# Patient Record
Sex: FEMALE | Race: WHITE | NOT HISPANIC OR LATINO | Employment: FULL TIME | URBAN - METROPOLITAN AREA
[De-identification: names, ages, dates, MRNs, and addresses within clinical notes are randomized per-mention and may not be internally consistent; named-entity substitution may affect disease eponyms.]

---

## 2019-10-31 ENCOUNTER — OFFICE VISIT (OUTPATIENT)
Dept: URGENT CARE | Facility: CLINIC | Age: 34
End: 2019-10-31
Payer: MEDICAID

## 2019-10-31 VITALS
HEIGHT: 62 IN | TEMPERATURE: 98 F | WEIGHT: 116 LBS | BODY MASS INDEX: 21.35 KG/M2 | HEART RATE: 80 BPM | RESPIRATION RATE: 20 BRPM | DIASTOLIC BLOOD PRESSURE: 80 MMHG | SYSTOLIC BLOOD PRESSURE: 110 MMHG

## 2019-10-31 DIAGNOSIS — R11.2 NON-INTRACTABLE VOMITING WITH NAUSEA, UNSPECIFIED VOMITING TYPE: Primary | ICD-10-CM

## 2019-10-31 DIAGNOSIS — R19.7 DIARRHEA, UNSPECIFIED TYPE: ICD-10-CM

## 2019-10-31 PROCEDURE — 99214 PR OFFICE/OUTPT VISIT, EST, LEVL IV, 30-39 MIN: ICD-10-PCS | Mod: S$PBB,,, | Performed by: PHYSICIAN ASSISTANT

## 2019-10-31 PROCEDURE — 99214 OFFICE O/P EST MOD 30 MIN: CPT | Mod: S$PBB,,, | Performed by: PHYSICIAN ASSISTANT

## 2019-10-31 PROCEDURE — S0119 ONDANSETRON 4 MG: HCPCS | Mod: PBBFAC,PO

## 2019-10-31 PROCEDURE — 99999 PR PBB SHADOW E&M-NEW PATIENT-LVL III: ICD-10-PCS | Mod: PBBFAC,,, | Performed by: PHYSICIAN ASSISTANT

## 2019-10-31 PROCEDURE — 99999 PR PBB SHADOW E&M-NEW PATIENT-LVL III: CPT | Mod: PBBFAC,,, | Performed by: PHYSICIAN ASSISTANT

## 2019-10-31 PROCEDURE — 99203 OFFICE O/P NEW LOW 30 MIN: CPT | Mod: PBBFAC,PO | Performed by: PHYSICIAN ASSISTANT

## 2019-10-31 RX ORDER — ONDANSETRON 4 MG/1
4 TABLET, ORALLY DISINTEGRATING ORAL EVERY 8 HOURS PRN
Qty: 12 TABLET | Refills: 0 | Status: SHIPPED | OUTPATIENT
Start: 2019-10-31 | End: 2022-07-22

## 2019-10-31 RX ORDER — BUPROPION HYDROCHLORIDE 300 MG/1
300 TABLET ORAL
COMMUNITY
Start: 2019-10-30 | End: 2022-07-22

## 2019-10-31 RX ORDER — TRAZODONE HYDROCHLORIDE 300 MG/1
300 TABLET ORAL
COMMUNITY
Start: 2019-10-30 | End: 2022-07-22

## 2019-10-31 RX ORDER — ONDANSETRON 4 MG/1
4 TABLET, ORALLY DISINTEGRATING ORAL
Status: COMPLETED | OUTPATIENT
Start: 2019-10-31 | End: 2019-10-31

## 2019-10-31 RX ADMIN — ONDANSETRON 4 MG: 4 TABLET, ORALLY DISINTEGRATING ORAL at 12:10

## 2019-10-31 NOTE — PATIENT INSTRUCTIONS
Please make sure you are drinking lots of fluids (1/2 Gatorade/Powerade, 1/2 water) and getting plenty of rest.    Eat a bland diet of bananas, rice, applesauce, toast, crackers--advance your diet as you tolerate these foods.    You were prescribed Zofran for nausea.    Please follow-up with your primary care provider if your symptoms continue for longer than 5-7 days.    Go to the ER for any worsening or concerning symptoms.      Nonspecific Vomiting and Diarrhea (Adult)  Vomiting and diarrhea can have many causes, including:  · Helping your body get rid of harmful substances   · Gastroenteritis caused by viruses, parasites, bacteria, or toxins.  · Allergy to or side effect of a food or medicine  · Severe stress or worry (anxiety)   · Other illnesses  · Pregnancy  It is often hard to pinpoint an exact cause, even with testing. Vomiting and diarrhea often go away within a day or two without problems. If they continue, though, they can lead to too much loss of fluid (dehydration). This can be serious if not treated.    Home care  Medications  · You may use acetaminophen or NSAID medicines like ibuprofen or naproxen to control fever, unless another medicine was prescribed. If you have chronic liver or kidney disease, talk with your healthcare provider before using these medicines. Also talk with your provider if you've had a stomach ulcer or gastrointestinal bleeding. Don't give aspirin to anyone under 18 years of age who is ill with a fever. Don't use NSAID medicines if you are already taking one for another condition (like arthritis) or are on aspirin (such as for heart disease or after a stroke)  · If medicines for diarrhea or vomiting were prescribed, take these only as directed. Never take these without a healthcare providers approval.  General care  · If symptoms are severe, rest at home for the next 24 hours, or until you are feeling better.  · Washing your hands with soap and water, or using alcohol-based  hand  is the best way to stop the spread of infection. Wash your hands after touching anyone who is sick.  · Wash your hands after using the toilet and before meals. Clean the toilet after each use.  · Caffeine, tobacco, and alcohol can make the diarrhea, cramping, and pain worse. Remember, caffeine not only is in coffee, but also is in chocolate, some energy drinks, and teas.  Diet  · Water and clear liquids are important so you don't get dehydrated. Drink a small amount at a time. Don't guzzle down the drinks. That may increase your nausea, make cramping worse, and cause the drinks to come back up.  · Sports drinks may also help. Make sure they are not too sugary, because this can sometimes make things worse. Also, don't drink beverages that are too acidic, like orange juice and grape juice.  · If you are very dehydrated, sports drinks aren't a good choice. They have too much sugar and not enough electrolytes. In this case, commercially available products called oral rehydration solutions are best.  Food  · Don't force yourself to eat, especially if you have cramps, diarrhea, or vomiting. Eat just a little at a time, and then wait a few minutes before you try to eat more.  · Don't eat fatty, greasy, spicy, or fried foods.  · Don't eat dairy products if you have diarrhea. They can make it worse.  During the first 24 hours (the first full day), follow the diet below:  · Beverages: Sports drinks, soft drinks without caffeine, mineral water, and decaffeinated tea and coffee  · Soups: Clear broth, consommé, and bouillon  · Desserts: Plain gelatin, popsicles, and fruit juice bars  During the next 24 hours (the second day), you may add the following to the above if you are better. If not, continue what you did the first day:  · Hot cereal, plain toast, bread, rolls, crackers  · Plain noodles, rice, mashed potatoes, chicken noodle or rice soup  · Unsweetened canned fruit (avoid pineapple), bananas  · Limit fat  intake to less than 15 grams per day by avoiding margarine, butter, oils, mayonnaise, sauces, gravies, fried foods, peanut butter, meat, poultry, and fish.  · Limit fiber. Avoid raw or cooked vegetables, fresh fruits (except bananas) and bran cereals.  · Limit caffeine and chocolate. No spices or seasonings except salt.  During the next 24 hours:  · Gradually resume a normal diet, as you feel better and your symptoms improve.  · If at any time your symptoms start getting worse again, go back to clear liquids until you feel better.  Food preparation  · If you have diarrhea, you should not prepare food for others. When preparing foods, wash your hands before and after.  · Wash your hands or use alcohol-based  after using cutting boards, countertops, and knives that have been in contact with raw food.  · Keep uncooked meats away from cooked and ready-to-eat foods.  Follow-up care  Follow up with your healthcare advisor, or as advised. Call if you do not get better in the next 2 to 3 days. If a stool (diarrhea) sample was taken, or cultures done, you will be told if they are positive, or if your treatment needs to be changed. You may call as directed for the results.  If X-rays were taken, and a radiologist has not yet looked at them, he or she will do so. You will be told if there is a change in the reading, especially if it affects your treatment.  Call 911  Call 911 if any of these occur:  · Trouble breathing  · Chest pain  · Confusion  · Severe drowsiness or trouble awakening  · Fainting or loss of consciousness  · Rapid heart rate  · Seizure  · Stiff neck  · Severe weakness, dizziness, or lightheadedness  When to seek medical advice  Call your healthcare provider right away if any of these occur:  · Bloody or black vomit or stools.  · Severe, steady abdominal pain or any abdominal pain that is getting worse.  · Severe headache or stiff neck  · An inability to hold down even sips of liquids for more than 12  hours.  · Vomiting that lasts more than 24 hours.  · Diarrhea that lasts more than 24 hours.  · Fever of 100.4°F (38.0°C) or higher that lasts more than 48 hours, or as directed by your health care provider  · Yellowish color to your skin or the whites of your eyes.  · Signs of dehydration, such as dry mouth, little urine (less than every 6 hours), or very dark urine.  Date Last Reviewed: 1/3/2016  © 7203-2875 [employer]. [address] [place] [address], [place] [address]. All rights reserved. This information is not intended as a substitute for professional medical care. Always follow your healthcare professional's instructions.        Self-Care for Vomiting and Diarrhea    Vomiting and diarrhea can make you miserable. Your stomach and bowels are reacting to an irritant. This might be food, medicine, or viral stomach flu. Vomiting and diarrhea are two ways your body can remove the problem from your system. Nausea is a symptom that discourages you from eating. This gives your stomach and bowels time to recover. To get back to normal, start with self-care to ease your discomfort.  Drink liquids  Drink or sip liquids to avoid losing too much fluid (dehydration):  · Clear liquids such as water or broth are the best choices.  · Do not drink beverages with a lot of sugar in them, such as juices and sodas. These can make diarrhea worse.  · If you have severe vomiting, do not drink sport drinks, such as electrolyte solutions. These don't have the right mix of water, sugar, and minerals. They can also make the symptoms worse. In this situation, commercially available oral rehydration solutions are best.   · Suck on ice chips if the thought of drinking something makes you queasy.  When youre able to eat again  Tips include the following:  · As your appetite comes back, you can resume your normal diet.  · Ask your healthcare provider whether you should stay away from any foods.  Medicines  Know the following about  medicines:  · Vomiting and diarrhea are ways your body uses to rid itself of harmful substances such as bacteria. DO NOT use antidiarrheal or antivomiting (antiemetic) medicines unless your healthcare provider tells you to do so.  · Aspirin, medicine with aspirin, and many aspirin substitutes can irritate your stomach. So avoid them when you have stomach upset.  · Certain prescription and over-the-counter medicines can cause vomiting and diarrhea. Talk with your healthcare provider about any medicines you take that may be causing these symptoms.  · Certain over-the-counter antihistamines can help control nausea. Other medicines can help soothe stomach upset. Ask your healthcare provider which medicines may help you.  When to call your healthcare provider  Call your healthcare provider if you have:  · Bloody or black vomit or stools  · Severe, steady belly pain  · Vomiting with a severe headache or vomiting after a head injury  · Vomiting and diarrhea together for more than an hour  · An inability to hold down even sips of liquids for more than 12 hours  · Vomiting that lasts more than 24 hours  · Severe diarrhea that lasts more than 2 days  · Yellowish color to your skin or the whites of your eyes  · Inability to urinate. In infants and young children, not making wet diapers.    Date Last Reviewed: 6/1/2016  © 4428-2989 [employer] [place]. [address] [place] [address], [place] [address]. All rights reserved. This information is not intended as a substitute for professional medical care. Always follow your healthcare professional's instructions.

## 2019-10-31 NOTE — PROGRESS NOTES
"Subjective:       Patient ID: Yulia Warren is a 33 y.o. female.    Vitals:  height is 5' 2" (1.575 m) and weight is 52.6 kg (116 lb). Her tympanic temperature is 98.1 °F (36.7 °C). Her blood pressure is 110/80 and her pulse is 80. Her respiration is 20.     Chief Complaint: Emesis    CC: Nausea; Vomiting; Diarrhea    HPI: 33 y.o. Female presents for consideration of nausea, vomiting and diarrhea which began yesterday. She reports 3 episodes of emesis this morning and 1 episode of non-bloody, brown watery diarrhea. She denies fever, abdominal pain, dysuria, vaginal complaints, consumption of raw food, recent foreign travel. She denies attempted treatment of symptoms.       Constitution: Negative for chills, sweating, fatigue and fever.   HENT: Negative for ear pain, ear discharge, congestion and sore throat.    Neck: Negative for painful lymph nodes.   Cardiovascular: Negative for chest pain and leg swelling.   Eyes: Negative for eye itching, eye pain and eye redness.   Respiratory: Negative for cough and shortness of breath.    Gastrointestinal: Positive for nausea, vomiting and diarrhea. Negative for abdominal pain, constipation, bright red blood in stool, dark colored stools and rectal bleeding.   Genitourinary: Negative for dysuria, frequency, urgency, history of kidney stones, vaginal discharge and vaginal bleeding.   Musculoskeletal: Negative for muscle cramps and muscle ache.   Skin: Negative for pale, rash and erythema.   Allergic/Immunologic: Negative for seasonal allergies.   Neurological: Negative for dizziness, light-headedness, passing out and headaches.   Hematologic/Lymphatic: Negative for swollen lymph nodes.   Psychiatric/Behavioral: Negative for nervous/anxious. The patient is not nervous/anxious.        Objective:      Physical Exam   Constitutional: She is oriented to person, place, and time. Vital signs are normal. She appears well-developed and well-nourished. She is cooperative.  Non-toxic " appearance. She does not have a sickly appearance. She appears ill. No distress.   HENT:   Head: Normocephalic and atraumatic.   Right Ear: Tympanic membrane, external ear and ear canal normal.   Left Ear: Tympanic membrane, external ear and ear canal normal.   Nose: Nose normal.   Mouth/Throat: Uvula is midline, oropharynx is clear and moist and mucous membranes are normal.   Eyes: Pupils are equal, round, and reactive to light. Conjunctivae, EOM and lids are normal.   Neck: Trachea normal, normal range of motion, full passive range of motion without pain and phonation normal. Neck supple.   Cardiovascular: Normal rate, regular rhythm, normal heart sounds and intact distal pulses.   Pulmonary/Chest: Effort normal and breath sounds normal. No stridor. No respiratory distress. She has no decreased breath sounds. She has no wheezes. She has no rhonchi. She has no rales.   Abdominal: Soft. Bowel sounds are normal. She exhibits no distension and no mass. There is no tenderness. There is no rigidity, no rebound and no guarding.   Musculoskeletal: Normal range of motion.   Neurological: She is alert and oriented to person, place, and time.   Skin: Skin is warm, dry, intact, not diaphoretic and no rash. Capillary refill takes less than 2 seconds. erythema  Psychiatric: She has a normal mood and affect. Her behavior is normal. Judgment and thought content normal.   Nursing note and vitals reviewed.        Assessment:       1. Non-intractable vomiting with nausea, unspecified vomiting type    2. Diarrhea, unspecified type        Plan:         Non-intractable vomiting with nausea, unspecified vomiting type  -     ondansetron disintegrating tablet 4 mg  -     ondansetron (ZOFRAN-ODT) 4 MG TbDL; Take 1 tablet (4 mg total) by mouth every 8 (eight) hours as needed (nausea).  Dispense: 12 tablet; Refill: 0    Diarrhea, unspecified type    -  Vitals are reassuring.  -  I suspect symptom presentation is secondary to viral etiology  versus food-borne ilness. I will treat with Zofran and recommend increased fluid rehydration and bland diet advancing as tolerated.   - Stressed that patient should not take anti-diarrheal medications    I have discussed the diagnosis, treatment plan and recommendations for follow-up with primary care and patient verbalized understanding and is agreeable to the plan. ED precautions given. AVS printed and given to patient upon discharge with information regarding this visit. All questions were addressed prior to discharge.    Amada English PA-C

## 2022-05-18 ENCOUNTER — PATIENT MESSAGE (OUTPATIENT)
Dept: PRIMARY CARE CLINIC | Facility: CLINIC | Age: 37
End: 2022-05-18
Payer: MEDICAID

## 2022-07-14 ENCOUNTER — TELEPHONE (OUTPATIENT)
Dept: PRIMARY CARE CLINIC | Facility: CLINIC | Age: 37
End: 2022-07-14
Payer: MEDICAID

## 2022-07-14 NOTE — TELEPHONE ENCOUNTER
----- Message from Ariella Soto sent at 7/14/2022 12:02 PM CDT -----  Contact: [phone] Patient  Patient is returning a phone call.  Who left a message for the patient: Cordelia Thornton MA  Does patient know what this is regarding:  Appt: Pt advised of 07/22 appt  Would you like a call back, or a response through your [facility] portal?: Call back  Comments:

## 2022-07-22 ENCOUNTER — LAB VISIT (OUTPATIENT)
Dept: LAB | Facility: HOSPITAL | Age: 37
End: 2022-07-22
Attending: NURSE PRACTITIONER
Payer: MEDICAID

## 2022-07-22 ENCOUNTER — OFFICE VISIT (OUTPATIENT)
Dept: PRIMARY CARE CLINIC | Facility: CLINIC | Age: 37
End: 2022-07-22
Payer: MEDICAID

## 2022-07-22 VITALS
OXYGEN SATURATION: 98 % | DIASTOLIC BLOOD PRESSURE: 80 MMHG | HEIGHT: 62 IN | HEART RATE: 70 BPM | RESPIRATION RATE: 20 BRPM | SYSTOLIC BLOOD PRESSURE: 112 MMHG | WEIGHT: 121 LBS | TEMPERATURE: 98 F | BODY MASS INDEX: 22.26 KG/M2

## 2022-07-22 DIAGNOSIS — Z11.59 ENCOUNTER FOR HEPATITIS C SCREENING TEST FOR LOW RISK PATIENT: ICD-10-CM

## 2022-07-22 DIAGNOSIS — F41.9 ANXIETY: ICD-10-CM

## 2022-07-22 DIAGNOSIS — Z00.00 ENCOUNTER FOR MEDICAL EXAMINATION TO ESTABLISH CARE: ICD-10-CM

## 2022-07-22 DIAGNOSIS — Z12.4 SCREENING FOR CERVICAL CANCER: ICD-10-CM

## 2022-07-22 DIAGNOSIS — Z00.00 ENCOUNTER FOR MEDICAL EXAMINATION TO ESTABLISH CARE: Primary | ICD-10-CM

## 2022-07-22 LAB
ALBUMIN SERPL BCP-MCNC: 4.6 G/DL (ref 3.5–5.2)
ALP SERPL-CCNC: 54 U/L (ref 55–135)
ALT SERPL W/O P-5'-P-CCNC: 11 U/L (ref 10–44)
ANION GAP SERPL CALC-SCNC: 10 MMOL/L (ref 8–16)
AST SERPL-CCNC: 17 U/L (ref 10–40)
BASOPHILS # BLD AUTO: 0.04 K/UL (ref 0–0.2)
BASOPHILS NFR BLD: 0.5 % (ref 0–1.9)
BILIRUB SERPL-MCNC: 0.6 MG/DL (ref 0.1–1)
BUN SERPL-MCNC: 8 MG/DL (ref 6–20)
CALCIUM SERPL-MCNC: 9.7 MG/DL (ref 8.7–10.5)
CHLORIDE SERPL-SCNC: 105 MMOL/L (ref 95–110)
CHOLEST SERPL-MCNC: 225 MG/DL (ref 120–199)
CHOLEST/HDLC SERPL: 3.9 {RATIO} (ref 2–5)
CO2 SERPL-SCNC: 27 MMOL/L (ref 23–29)
CREAT SERPL-MCNC: 0.7 MG/DL (ref 0.5–1.4)
DIFFERENTIAL METHOD: ABNORMAL
EOSINOPHIL # BLD AUTO: 0 K/UL (ref 0–0.5)
EOSINOPHIL NFR BLD: 0.5 % (ref 0–8)
ERYTHROCYTE [DISTWIDTH] IN BLOOD BY AUTOMATED COUNT: 13.4 % (ref 11.5–14.5)
EST. GFR  (AFRICAN AMERICAN): >60 ML/MIN/1.73 M^2
EST. GFR  (NON AFRICAN AMERICAN): >60 ML/MIN/1.73 M^2
ESTIMATED AVG GLUCOSE: 88 MG/DL (ref 68–131)
GLUCOSE SERPL-MCNC: 91 MG/DL (ref 70–110)
HBA1C MFR BLD: 4.7 % (ref 4–5.6)
HCT VFR BLD AUTO: 44.8 % (ref 37–48.5)
HDLC SERPL-MCNC: 57 MG/DL (ref 40–75)
HDLC SERPL: 25.3 % (ref 20–50)
HGB BLD-MCNC: 13.9 G/DL (ref 12–16)
IMM GRANULOCYTES # BLD AUTO: 0.03 K/UL (ref 0–0.04)
IMM GRANULOCYTES NFR BLD AUTO: 0.4 % (ref 0–0.5)
LDLC SERPL CALC-MCNC: 132 MG/DL (ref 63–159)
LYMPHOCYTES # BLD AUTO: 2.9 K/UL (ref 1–4.8)
LYMPHOCYTES NFR BLD: 34.8 % (ref 18–48)
MCH RBC QN AUTO: 27.9 PG (ref 27–31)
MCHC RBC AUTO-ENTMCNC: 31 G/DL (ref 32–36)
MCV RBC AUTO: 90 FL (ref 82–98)
MONOCYTES # BLD AUTO: 0.5 K/UL (ref 0.3–1)
MONOCYTES NFR BLD: 6.2 % (ref 4–15)
NEUTROPHILS # BLD AUTO: 4.9 K/UL (ref 1.8–7.7)
NEUTROPHILS NFR BLD: 57.6 % (ref 38–73)
NONHDLC SERPL-MCNC: 168 MG/DL
NRBC BLD-RTO: 0 /100 WBC
PLATELET # BLD AUTO: 328 K/UL (ref 150–450)
PMV BLD AUTO: 11.7 FL (ref 9.2–12.9)
POTASSIUM SERPL-SCNC: 3.9 MMOL/L (ref 3.5–5.1)
PROT SERPL-MCNC: 7.7 G/DL (ref 6–8.4)
RBC # BLD AUTO: 4.98 M/UL (ref 4–5.4)
SODIUM SERPL-SCNC: 142 MMOL/L (ref 136–145)
T3 SERPL-MCNC: 128 NG/DL (ref 60–180)
T4 FREE SERPL-MCNC: 0.88 NG/DL (ref 0.71–1.51)
TRIGL SERPL-MCNC: 180 MG/DL (ref 30–150)
TSH SERPL DL<=0.005 MIU/L-ACNC: 2.12 UIU/ML (ref 0.4–4)
WBC # BLD AUTO: 8.44 K/UL (ref 3.9–12.7)

## 2022-07-22 PROCEDURE — 3079F PR MOST RECENT DIASTOLIC BLOOD PRESSURE 80-89 MM HG: ICD-10-PCS | Mod: CPTII,,, | Performed by: NURSE PRACTITIONER

## 2022-07-22 PROCEDURE — 80061 LIPID PANEL: CPT | Performed by: NURSE PRACTITIONER

## 2022-07-22 PROCEDURE — 3074F PR MOST RECENT SYSTOLIC BLOOD PRESSURE < 130 MM HG: ICD-10-PCS | Mod: CPTII,,, | Performed by: NURSE PRACTITIONER

## 2022-07-22 PROCEDURE — 84480 ASSAY TRIIODOTHYRONINE (T3): CPT | Performed by: NURSE PRACTITIONER

## 2022-07-22 PROCEDURE — 99203 PR OFFICE/OUTPT VISIT, NEW, LEVL III, 30-44 MIN: ICD-10-PCS | Mod: S$PBB,,, | Performed by: NURSE PRACTITIONER

## 2022-07-22 PROCEDURE — 99999 PR PBB SHADOW E&M-EST. PATIENT-LVL V: ICD-10-PCS | Mod: PBBFAC,,, | Performed by: NURSE PRACTITIONER

## 2022-07-22 PROCEDURE — 99999 PR PBB SHADOW E&M-EST. PATIENT-LVL V: CPT | Mod: PBBFAC,,, | Performed by: NURSE PRACTITIONER

## 2022-07-22 PROCEDURE — 3074F SYST BP LT 130 MM HG: CPT | Mod: CPTII,,, | Performed by: NURSE PRACTITIONER

## 2022-07-22 PROCEDURE — 3008F PR BODY MASS INDEX (BMI) DOCUMENTED: ICD-10-PCS | Mod: CPTII,,, | Performed by: NURSE PRACTITIONER

## 2022-07-22 PROCEDURE — 84443 ASSAY THYROID STIM HORMONE: CPT | Performed by: NURSE PRACTITIONER

## 2022-07-22 PROCEDURE — 36415 COLL VENOUS BLD VENIPUNCTURE: CPT | Mod: PN | Performed by: NURSE PRACTITIONER

## 2022-07-22 PROCEDURE — 86803 HEPATITIS C AB TEST: CPT | Performed by: NURSE PRACTITIONER

## 2022-07-22 PROCEDURE — 83036 HEMOGLOBIN GLYCOSYLATED A1C: CPT | Performed by: NURSE PRACTITIONER

## 2022-07-22 PROCEDURE — 1160F RVW MEDS BY RX/DR IN RCRD: CPT | Mod: CPTII,,, | Performed by: NURSE PRACTITIONER

## 2022-07-22 PROCEDURE — 99203 OFFICE O/P NEW LOW 30 MIN: CPT | Mod: S$PBB,,, | Performed by: NURSE PRACTITIONER

## 2022-07-22 PROCEDURE — 3044F PR MOST RECENT HEMOGLOBIN A1C LEVEL <7.0%: ICD-10-PCS | Mod: CPTII,,, | Performed by: NURSE PRACTITIONER

## 2022-07-22 PROCEDURE — 3079F DIAST BP 80-89 MM HG: CPT | Mod: CPTII,,, | Performed by: NURSE PRACTITIONER

## 2022-07-22 PROCEDURE — 80053 COMPREHEN METABOLIC PANEL: CPT | Performed by: NURSE PRACTITIONER

## 2022-07-22 PROCEDURE — 84439 ASSAY OF FREE THYROXINE: CPT | Performed by: NURSE PRACTITIONER

## 2022-07-22 PROCEDURE — 1160F PR REVIEW ALL MEDS BY PRESCRIBER/CLIN PHARMACIST DOCUMENTED: ICD-10-PCS | Mod: CPTII,,, | Performed by: NURSE PRACTITIONER

## 2022-07-22 PROCEDURE — 3008F BODY MASS INDEX DOCD: CPT | Mod: CPTII,,, | Performed by: NURSE PRACTITIONER

## 2022-07-22 PROCEDURE — 85025 COMPLETE CBC W/AUTO DIFF WBC: CPT | Performed by: NURSE PRACTITIONER

## 2022-07-22 PROCEDURE — 1159F PR MEDICATION LIST DOCUMENTED IN MEDICAL RECORD: ICD-10-PCS | Mod: CPTII,,, | Performed by: NURSE PRACTITIONER

## 2022-07-22 PROCEDURE — 3044F HG A1C LEVEL LT 7.0%: CPT | Mod: CPTII,,, | Performed by: NURSE PRACTITIONER

## 2022-07-22 PROCEDURE — 1159F MED LIST DOCD IN RCRD: CPT | Mod: CPTII,,, | Performed by: NURSE PRACTITIONER

## 2022-07-22 PROCEDURE — 99215 OFFICE O/P EST HI 40 MIN: CPT | Mod: PBBFAC,PN | Performed by: NURSE PRACTITIONER

## 2022-07-22 RX ORDER — HYDROXYZINE PAMOATE 50 MG/1
50 CAPSULE ORAL EVERY 6 HOURS PRN
Qty: 30 CAPSULE | Refills: 0 | Status: SHIPPED | OUTPATIENT
Start: 2022-07-22 | End: 2023-03-21

## 2022-07-22 NOTE — PROGRESS NOTES
"Subjective:      Patient ID: Yulia Warren is a 36 y.o. female.    Chief Complaint: No chief complaint on file.    /80 (BP Location: Left arm, Patient Position: Sitting, BP Method: Medium (Manual))   Pulse 70   Temp 98.1 °F (36.7 °C) (Temporal)   Resp 20   Ht 5' 2" (1.575 m)   Wt 54.9 kg (121 lb)   SpO2 98%   BMI 22.13 kg/m²     37 y/o female presents to clinic to establish care. States she received new insurance cards and was assigned a new PCP to begin seeing. Pt also states she has a history of anxiety and is no longer taking the medication because "those antidepressants don't work for me". Last pap has been a few years ago, has only had one since her partial hysterectomy. Denies any other c/o.      Review of patient's allergies indicates:  No Known Allergies     Review of Systems   HENT: Negative for hearing loss.    Eyes: Negative for discharge.   Respiratory: Negative for wheezing.    Cardiovascular: Positive for chest pain and palpitations.   Gastrointestinal: Negative for blood in stool, constipation, diarrhea and vomiting.   Genitourinary: Negative for dysuria and hematuria.   Musculoskeletal: Negative for neck pain.   Neurological: Positive for headaches. Negative for weakness.   Endo/Heme/Allergies: Negative for polydipsia.      Objective:      Physical Exam  Vitals reviewed.   Constitutional:       Appearance: She is well-developed.   HENT:      Head: Normocephalic and atraumatic.      Right Ear: External ear normal.      Left Ear: External ear normal.      Nose: Nose normal. No mucosal edema or rhinorrhea.      Mouth/Throat:      Mouth: Mucous membranes are moist.      Pharynx: Uvula midline.   Eyes:      General: Lids are normal.      Conjunctiva/sclera: Conjunctivae normal.      Pupils: Pupils are equal, round, and reactive to light.   Cardiovascular:      Rate and Rhythm: Normal rate and regular rhythm.      Pulses: Normal pulses.      Heart sounds: Normal heart sounds.   Pulmonary: "      Effort: Pulmonary effort is normal.      Breath sounds: Normal breath sounds. No decreased breath sounds or wheezing.   Abdominal:      General: Bowel sounds are normal.      Palpations: Abdomen is soft.      Tenderness: There is no abdominal tenderness.   Genitourinary:     Comments: deferred  Musculoskeletal:         General: Normal range of motion.      Cervical back: Normal range of motion and neck supple.   Lymphadenopathy:      Cervical: No cervical adenopathy.   Skin:     General: Skin is warm and dry.      Capillary Refill: Capillary refill takes less than 2 seconds.      Coloration: Skin is not cyanotic or pale.   Neurological:      Mental Status: She is alert and oriented to person, place, and time.      Cranial Nerves: No cranial nerve deficit.      Sensory: Sensation is intact.      Motor: Motor function is intact.      Coordination: Coordination is intact.   Psychiatric:         Attention and Perception: Attention normal.         Mood and Affect: Mood is anxious.         Speech: Speech normal.         Behavior: Behavior normal.         Thought Content: Thought content normal.         Cognition and Memory: Cognition normal.         Assessment:       1. Encounter for medical examination to establish care    2. Screening for cervical cancer    3. Encounter for hepatitis C screening test for low risk patient    4. Anxiety        Plan:     Encounter for medical examination to establish care  -     CBC Auto Differential; Future; Expected date: 07/22/2022  -     Comprehensive Metabolic Panel; Future; Expected date: 07/22/2022  -     Lipid Panel; Future; Expected date: 07/22/2022  -     TSH; Future; Expected date: 07/22/2022  -     T3; Future; Expected date: 07/22/2022  -     T4, Free; Future; Expected date: 07/22/2022  -     Hemoglobin A1C; Future; Expected date: 07/22/2022  -     Ambulatory referral/consult to Gynecology; Future; Expected date: 07/29/2022  -     HEPATITIS C ANTIBODY; Future; Expected  date: 07/22/2022    Screening for cervical cancer  -     Ambulatory referral/consult to Gynecology; Future; Expected date: 07/29/2022    Encounter for hepatitis C screening test for low risk patient  -     HEPATITIS C ANTIBODY; Future; Expected date: 07/22/2022    Anxiety  -     Ambulatory referral/consult to Psychiatry; Future; Expected date: 10/22/2022  -     hydrOXYzine pamoate (VISTARIL) 50 MG Cap; Take 1 capsule (50 mg total) by mouth every 6 (six) hours as needed (anxiety).  Dispense: 30 capsule; Refill: 0    What care is needed at home?   · Ask your doctor what you need to do when you go home. Make sure you ask questions if you do not understand what the doctor says.  · Set a time to talk with a counselor about your worries and feelings. This can help you with your anxiety.  · Take care to follow all instructions when you take your medicines.  · Limit alcohol and caffeine.  · Learn ways to manage stress. Relaxation methods like reflection, deep breathing, and muscle relaxation may be helpful. Things like yoga, exercise, and damián chi are also good.  · Talk about your feelings with family members and friends you trust. Talk to someone who can help you see how your thoughts at certain times may raise your anxiety.     What follow-up care is needed?   Your doctor may ask you to make visits to the office to check on your progress. Be sure to keep these visits.  What drugs may be needed?   The doctor may order drugs to help the physical signs of anxiety. Make sure that you take the drugs as taught to you by the doctor. Talk with your doctor about any side effects and ask how long you should take the drug.  Will physical activity be limited?   You may take part in physical activities. Some people are limited because of their anxiety or fear. Talk with your doctor about the right amount of activity for you.  What changes to diet are needed?   Eat a variety of healthy foods and limit drinks with caffeine. You should  avoid alcohol, energy drinks, and over-the-counter stimulants.  What problems could happen?   If your anxiety is not treated, it can result in:  · Staying away from work or social events  · Not being able to do everyday tasks  · Keeping away from family and friends  What can be done to prevent this health problem?   · Learn what events, people, or things upset you. Limit your contact with them.  · Talk about your feelings. Talk to someone who can help you see how your thoughts at certain times may raise your anxiety.  · Seek support from your friends and family. Find someone who calms you down. Ask if you can call them when you are getting anxious.  When do I need to call the doctor?   · You feel you may harm yourself or someone else.  · You can also call a mental health hotline for help.  · You have any physical symptoms, such as chest pain, trouble breathing, or severe belly pain, that could be a sign of a serious problem.  · If you are short of breath.  · If you do not feel like you can be alone.           Answers for HPI/ROS submitted by the patient on 7/19/2022  activity change: Yes  unexpected weight change: No  rhinorrhea: No  trouble swallowing: No  visual disturbance: Yes  chest tightness: Yes  polyuria: No  difficulty urinating: No  menstrual problem: No  joint swelling: Yes  arthralgias: Yes  confusion: No  dysphoric mood: No

## 2022-07-25 LAB — HCV AB SERPL QL IA: NEGATIVE

## 2022-10-05 ENCOUNTER — TELEPHONE (OUTPATIENT)
Dept: PSYCHIATRY | Facility: CLINIC | Age: 37
End: 2022-10-05
Payer: MEDICAID

## 2022-10-05 NOTE — TELEPHONE ENCOUNTER
----- Message from Jane Davis sent at 10/5/2022 10:32 AM CDT -----  Regarding: Voicemail  Called for np appointment

## 2022-11-16 ENCOUNTER — TELEPHONE (OUTPATIENT)
Dept: PRIMARY CARE CLINIC | Facility: CLINIC | Age: 37
End: 2022-11-16
Payer: MEDICAID

## 2022-11-16 NOTE — TELEPHONE ENCOUNTER
Scheduled patient appointment virtual appointment on 11/18/22 with Jennifer Kurtz due to no available in clinic appointments. She voiced understanding.

## 2022-11-16 NOTE — TELEPHONE ENCOUNTER
----- Message from Debbie Lowery sent at 11/16/2022  9:10 AM CST -----  Contact: [place], [phone]  Patient is returning a phone call.  Who left a message for the patient: ?  Does patient know what this is regarding:  Appointment  Would you like a call back, or a response through your [facility] portal?:   Call back  Comments:  Missed your call, please call her back. Thanks.

## 2023-01-04 ENCOUNTER — PATIENT MESSAGE (OUTPATIENT)
Dept: PSYCHIATRY | Facility: CLINIC | Age: 38
End: 2023-01-04

## 2023-01-06 ENCOUNTER — TELEPHONE (OUTPATIENT)
Dept: PSYCHIATRY | Facility: CLINIC | Age: 38
End: 2023-01-06

## 2023-03-21 ENCOUNTER — OFFICE VISIT (OUTPATIENT)
Dept: PSYCHIATRY | Facility: CLINIC | Age: 38
End: 2023-03-21
Payer: MEDICAID

## 2023-03-21 VITALS
HEART RATE: 66 BPM | WEIGHT: 128.31 LBS | SYSTOLIC BLOOD PRESSURE: 127 MMHG | BODY MASS INDEX: 23.47 KG/M2 | DIASTOLIC BLOOD PRESSURE: 84 MMHG

## 2023-03-21 DIAGNOSIS — F51.05 INSOMNIA DUE TO OTHER MENTAL DISORDER: ICD-10-CM

## 2023-03-21 DIAGNOSIS — F41.1 GENERALIZED ANXIETY DISORDER: Primary | ICD-10-CM

## 2023-03-21 DIAGNOSIS — F99 INSOMNIA DUE TO OTHER MENTAL DISORDER: ICD-10-CM

## 2023-03-21 PROCEDURE — 1159F PR MEDICATION LIST DOCUMENTED IN MEDICAL RECORD: ICD-10-PCS | Mod: CPTII,,, | Performed by: PSYCHOLOGIST

## 2023-03-21 PROCEDURE — 3079F DIAST BP 80-89 MM HG: CPT | Mod: CPTII,,, | Performed by: PSYCHOLOGIST

## 2023-03-21 PROCEDURE — 3008F BODY MASS INDEX DOCD: CPT | Mod: CPTII,,, | Performed by: PSYCHOLOGIST

## 2023-03-21 PROCEDURE — 99999 PR PBB SHADOW E&M-EST. PATIENT-LVL III: CPT | Mod: PBBFAC,HB,, | Performed by: PSYCHOLOGIST

## 2023-03-21 PROCEDURE — 3008F PR BODY MASS INDEX (BMI) DOCUMENTED: ICD-10-PCS | Mod: CPTII,,, | Performed by: PSYCHOLOGIST

## 2023-03-21 PROCEDURE — 99205 PR OFFICE/OUTPT VISIT, NEW, LEVL V, 60-74 MIN: ICD-10-PCS | Mod: S$PBB,,, | Performed by: PSYCHOLOGIST

## 2023-03-21 PROCEDURE — 99999 PR PBB SHADOW E&M-EST. PATIENT-LVL III: ICD-10-PCS | Mod: PBBFAC,HB,, | Performed by: PSYCHOLOGIST

## 2023-03-21 PROCEDURE — 3074F PR MOST RECENT SYSTOLIC BLOOD PRESSURE < 130 MM HG: ICD-10-PCS | Mod: CPTII,,, | Performed by: PSYCHOLOGIST

## 2023-03-21 PROCEDURE — 1159F MED LIST DOCD IN RCRD: CPT | Mod: CPTII,,, | Performed by: PSYCHOLOGIST

## 2023-03-21 PROCEDURE — 99205 OFFICE O/P NEW HI 60 MIN: CPT | Mod: S$PBB,,, | Performed by: PSYCHOLOGIST

## 2023-03-21 PROCEDURE — 3074F SYST BP LT 130 MM HG: CPT | Mod: CPTII,,, | Performed by: PSYCHOLOGIST

## 2023-03-21 PROCEDURE — 3079F PR MOST RECENT DIASTOLIC BLOOD PRESSURE 80-89 MM HG: ICD-10-PCS | Mod: CPTII,,, | Performed by: PSYCHOLOGIST

## 2023-03-21 PROCEDURE — 99213 OFFICE O/P EST LOW 20 MIN: CPT | Mod: PBBFAC | Performed by: PSYCHOLOGIST

## 2023-03-21 RX ORDER — CYANOCOBALAMIN 500 UG/1
SPRAY NASAL
COMMUNITY
Start: 2023-02-17

## 2023-03-21 RX ORDER — ERGOCALCIFEROL 1.25 MG/1
50000 CAPSULE ORAL
COMMUNITY
Start: 2023-02-16

## 2023-03-21 RX ORDER — ALPRAZOLAM 1 MG/1
TABLET ORAL
Qty: 20 TABLET | Refills: 2 | Status: SHIPPED | OUTPATIENT
Start: 2023-03-21 | End: 2023-05-08 | Stop reason: SDUPTHER

## 2023-03-21 RX ORDER — QUETIAPINE FUMARATE 25 MG/1
25-50 TABLET, FILM COATED ORAL NIGHTLY
Qty: 60 TABLET | Refills: 2 | Status: SHIPPED | OUTPATIENT
Start: 2023-03-21 | End: 2023-03-22 | Stop reason: SDUPTHER

## 2023-03-21 NOTE — PATIENT INSTRUCTIONS
"[facility]   PATIENT INFORMATION    We appreciate the opportunity to participate in your medical care and hope the following protocols will make it easier for you to receive quality treatment in our department.    PUNCTUALITY: Your appointment is scheduled for a fixed amount of time, reserved especially for you.  To get the benefit of your appointment, please arrive at least 15 minutes early to allow time for traffic, parking and registration.  Should you arrive more than 15 minutes late to your appointment, you will be rescheduled in order to assure your clinician has adequate time to assess you and provide helpful care.      APPOINTMENTS: Appointments are made by the nursing/front office staff or through the patient portal. Providers do not have access  to schedule appointments. Walk in appointments are not available. FOR EMERGENCIES, PLEASE GO THE CLOSEST EMERGENCY ROOM.    CANCELLATION/MISSED APPOINTMENTS:   In order to receive quality care, all appointments must be kept.  If you are unable to keep an appointment, please reschedule at least 3 days prior if possible. Late cancellations (within 24 hours of the appointment) and repeated no-show appointments may result in dismissal from the clinic. After two no show/late cancellation visits, you will receive a notice letter, alerting you to keep visits to prevent department dismissal. If another visit is missed after receipt of the notice, you will be discharged from the clinic. This policy is in effect to allow for other individuals on a long waiting list to be seen as soon as possible. Unlike other branches of medicine where several individuals can be scheduled in a 30 minute time slot, only one individual can be scheduled in any time slot in Psychiatry.     MESSAGES: For simple questions/concerns, you may contact your individual providers electronically through the "[facility]" portal or by calling [phone] " with messages relayed via office staff. If relevant, include pharmacy name and phone number, date of last visit and next scheduled visit, phone number where you can be reached throughout the day, and whether leaving a voicemail or message on an answering machine is acceptable. Messages will be returned by the Medical Assistant or Office Staff after your provider has reviewed the message.  Please allow 24 hours for a returned voicemail message before leaving another voicemail message. Voicemail messages will be checked each workday (Monday through Friday) during office hours (8:00 a.m. and 5:00 p.m.) and returned at most within one business day.  You may leave a non-urgent message after hours. Note that psychotherapy and medication management are not appropriate by telephone or the patient portal. Portal messages may take up to 72 hours for a direct response from your provider.    PRESCRIPTION REFILLS:  Please communicate with your prescriber about any refills you need during your appointment. You may also request refills through the [facility] portal (preferred) or by calling the clinic. Prescriptions will be filled during office hours.     Please do not wait until you are completely out of medication to request refills. Same day refills are not always possible. Patients may experience symptoms of withdrawal if they run out of medications. The patient assumes all responsibility when there is an issue with non-compliance with follow-up appointments and medications.  Some medications are controlled and regulated by the FDA and [facility]. Some of these medications can not be refilled before 30 days and require a face to face appointment.     PAPERWORK REQUESTS: If you have any forms or letters that need to be completed by your doctor, please present these at the beginning of the appointment to ensure that information needed to complete them is obtained during the office visit. Paperwork is completed during office visits  "only.    SPECIAL EVALUATIONS: Please note that our department is treatment-focused. As such, we focus on treatment-oriented evaluations and do not perform specialty or "forensic" evaluations. Examples are listed below.    Disability: We do not do disability evaluations.  Please contact Social Security Administration for evaluations and determinations. You will then sign releases allowing for records from your treatment providers to be forwarded to Social Security Administration to use in their evaluation.  Gun Permit: We do not offer Sound Judgment Evaluations or assessments leading to gun ownership, nor do we fill out or file paperwork relevant to owning, concealing or purchasing a firearm.  Emotional Support     Animals (PHUONG): We do not provide documentation, including letters, to aid in the acclamation that an Emotional Support Animal is required. Note that ESAs are not trained to perform tasks or recognize particular signs or symptoms. Rather, they are distinguished by the close, emotional, and supportive bond between the animal and the owner.       SAMPLES: We do not provide samples of any medications. If you have financial difficulties and are on a limited income, you may qualify for Patient Assistance Programs from various pharmaceutical companies. This will require that you complete paperwork with your financial information, but this does not guarantee that the company will approve the application. Alternative medication options can be discussed. Some companies offer vouchers or coupons for discounts.    REFERRALS/COORDINATION: You will be referred to other providers if we feel unable to adequately diagnose or treat your particular condition, or if collaboration with another provider would allow for better management of your condition.       This document is for information purposes only. Please refer to the full disclaimer and copyright statement available at http://www.[facility].health.[place].gov.au regarding the " information from this website before making use of such information.  See website www.[facility].health.wa.gov.au for more handouts and resources.    What is Sleep Hygiene?  Sleep hygiene is the term used to describe good sleep habits. Considerable research has gone into developing a set of guidelines and tips which are designed to enhance good sleeping, and there is much evidence to suggest that these strategies can provide long-term solutions to sleep difficulties. There are many medications which are used to treat insomnia,  but these tend to be only effective in the short-term. Ongoing use of sleeping pills may lead to dependence and interfere with developing good sleep habits independent of medication,  thereby prolonging sleep difficulties. Talk to your health professional about what is right for you, but we recommend good sleep hygiene as an important part of treating insomnia,  either with other strategies such as medication or cognitive therapy or alone.    Sleep Hygiene Tips  1) Get regular. One of the best ways to train your body to sleep well is to go to bed and get up at more or less the same time every day, even on weekends and days off! This regular rhythm will make you feel better and will give your body something to work from.  2) Sleep when sleepy. Only try to sleep when you actually feel tired or sleepy, rather than spending too much time awake in bed.  3) Get up & try again. If you havent been able to get to sleep after about 20 minutes or more, get up and do something calming or boring until you feel sleepy, then return to bed and try again. Sit quietly on the couch with the lights off (bright light will tell your brain that it is time to wake up), or read something boring like the phone book. Avoid doing anything that is too stimulating or interesting, as this will wake you up even more.  4) Avoid caffeine & nicotine. It is best to avoid consuming any caffeine (in coffee, tea, cola drinks,  chocolate, and some medications) or nicotine (cigarettes) for at least 4-6 hours before going to bed. These substances act as stimulants and interfere with the ability to fall asleep   5) Avoid alcohol. It is also best to avoid alcohol for at least 4-6 hours before going to bed. Many people believe that alcohol is relaxing and helps them to get to sleep at first, but it actually interrupts the quality of sleep.  6) Bed is for sleeping. Try not to use your bed for anything other than sleeping and sex, so that your body comes to associate bed with sleep. If you use bed as a place to watch TV, eat, read, work on your laptop, pay bills, and other things, your body will not learn this connection.  7) No naps. It is best to avoid taking naps during the day, to make sure that you are tired at bedtime. If you cant make it through the day without a nap, make sure it is for less than an hour and before 3pm.  8) Sleep rituals. You can develop your own rituals of things to remind your body that it is time to sleep - some people find it useful to do relaxing stretches or breathing exercises for 15 minutes before bed each night, or sit calmly with a cup of caffeine-free tea.  9) Bathtime. Having a hot bath 1-2 hours before bedtime can be useful, as it will raise your body temperature, causing you to feel sleepy as your body temperature drops again. Research shows that sleepiness is associated with a drop in body temperature.  10) No clock-watching. Many people who struggle with sleep tend to watch the clock too much. Frequently checking the clock during the night can wake you up (especially if you turn  on the light to read the time) and reinforces negative thoughts such as Oh no, look how late it is, Ill never get to sleep or its so early, I have only slept for 5 hours, this is  terrible.  11) Use a sleep diary. This worksheet can be a useful way of making sure you have the right facts about your sleep, rather than making  assumptions. Because a diary involves watching  the clock (see point 10) it is a good idea to only use it for two weeks to get an idea of what is going and then perhaps two months down the track to see how you are progressing.  12) Exercise. Regular exercise is a good idea to help with good sleep, but try not to do strenuous exercise in the 4 hours before bedtime. Morning walks are a great way to start the day feeling refreshed!  13) Eat right. A healthy, balanced diet will help you to sleep well, but timing is important. Some people find that a very empty stomach at bedtime is distracting, so it can be useful  to have a light snack, but a heavy meal soon before bed can also interrupt sleep. Some people recommend a warm glass of milk, which contains tryptophan, which acts as a natural  sleep inducer.  14) The right space. It is very important that your bed and bedroom are quiet and comfortable for sleeping. A cooler room with enough blankets to stay warm is best, and make sure you have curtains or an eyemask to block out early morning light and earplugs if there is noise outside your room.  15) Keep daytime routine the same. Even if you have a bad night sleep and are tired it is important that you try to keep your daytime activities the same as you had planned. That is, dont avoid activities because you feel tired. This can reinforce the insomnia.       Call In if problems  Call Report Side Effects   Encouraged to follow up with primary care / Gen Med MD for continued monitoring of general health and wellness  Call [phone] Or go to ER if Acute Concerns (especially if any thoughts of harm to self or other)          Sissy Wiley, PhD, MP  Advanced Practice Medical Psychologist  [facility]  [place] [address]  [phone]   [phone] fax

## 2023-03-21 NOTE — LETTER
March 21, 2023    [place]  [address]  [address]  [place] [address]         [facility]  [address] [facility]  [place] [address]  Phone: [phone]  Fax: [phone]   Patient: Yulia Warren   MR Number: [id number]   YOB: 1985   Date of Visit: 3/21/2023     Dear Dr. Wallace:    Thank you for referring Yulia Warren to me for evaluation. Below are the relevant portions of my assessment and plan of care.    Tod has a long history of anxiety with reported multiple failures in psychotropic medications.  As part of her anxiety, she reports difficulty with sleep.  We agreed to sent for gene testing to assist with medication options, and in the meantime, continue her Xanax as needed.  We also agreed to a trial of Seroquel to assist with anxiety and sleep at night.  She asked about the possibility of having ADHD, however since there is so much overlap between conditions with focus and concentration, we will stabilize her anxiety and then determine whether a further referral for testing is needed.  · Medication Management: Discussed risks, benefits, and alternatives to treatment plan documented above with patient. I answered all patient questions related to this plan, and patient expressed understanding and agreement.   Start Seroquel 25-50 mg hs; Xanax 1 mg prn #20  · Outside records/collateral information from additional sources: order release for records from PCP  · Therapy referral put in system  · Gene testing    If you have questions, please do not hesitate to call me. I look forward to following Yulia along with you.    Sincerely,    Sissy Wiley, PhD, MPAP   Advanced Practice Medical Psychologist
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
## 2023-03-21 NOTE — LETTER
March 21, 2023        Arti Earl, FNP-C  [address]  [facility] [address]  [facility] [address]             [facility]nd[facility]  [address] [facility] [address]  Phone: [phone]  Fax: [phone]   Patient: Yulia Warren   MR Number: [id number]   YOB: 1985   Date of Visit: 3/21/2023     Dear Dr. Earl,     I saw Yulia today for an initial evaluation.  Please see the attached, and let me know if you have any questions or need more information.  I appreciate following her along with you.    Sincerely,    Sissy Wiley, PhD, MPAP  Advanced Practice Medical Psychologist            [facility]

## 2023-03-21 NOTE — LETTER
March 21, 2023        [place]  [address]  [address]  [facility] [address]             [facility]  [address] [facility] [place] [address]  Phone: [phone]  Fax: [phone]   Patient: Yulia Warren   MR Number: [id number]   YOB: 1985   Date of Visit: 3/21/2023       Dear Dr. Wallace:    Thank you for referring Yulia Warren to me for evaluation. Attached you will find relevant portions of my assessment and plan of care.    If you have questions, please do not hesitate to call me. I look forward to following Yulia Warren along with you.    Sincerely,    Sissy Wiley, PhD, MPAP  Advanced Practice Medical Psychologist          [facility]

## 2023-03-22 DIAGNOSIS — F41.1 GENERALIZED ANXIETY DISORDER: ICD-10-CM

## 2023-03-22 RX ORDER — QUETIAPINE FUMARATE 25 MG/1
25-50 TABLET, FILM COATED ORAL NIGHTLY
Qty: 60 TABLET | Refills: 2 | Status: SHIPPED | OUTPATIENT
Start: 2023-03-22 | End: 2023-05-08 | Stop reason: ALTCHOICE

## 2023-03-22 NOTE — PROGRESS NOTES
Had fax from [facility] asking about dx code for Seroquel--had F41.1 in system; said would need PA    Sent to [facility]

## 2023-05-08 ENCOUNTER — OFFICE VISIT (OUTPATIENT)
Dept: PSYCHIATRY | Facility: CLINIC | Age: 38
End: 2023-05-08
Payer: MEDICAID

## 2023-05-08 DIAGNOSIS — G47.00 INSOMNIA, UNSPECIFIED TYPE: ICD-10-CM

## 2023-05-08 DIAGNOSIS — F41.1 GENERALIZED ANXIETY DISORDER: Primary | ICD-10-CM

## 2023-05-08 PROCEDURE — 99214 OFFICE O/P EST MOD 30 MIN: CPT | Mod: 95,,, | Performed by: PSYCHOLOGIST

## 2023-05-08 PROCEDURE — 99214 PR OFFICE/OUTPT VISIT, EST, LEVL IV, 30-39 MIN: ICD-10-PCS | Mod: 95,,, | Performed by: PSYCHOLOGIST

## 2023-05-08 RX ORDER — ALPRAZOLAM 1 MG/1
TABLET ORAL
Qty: 20 TABLET | Refills: 2 | Status: SHIPPED | OUTPATIENT
Start: 2023-05-08 | End: 2023-09-26 | Stop reason: SDUPTHER

## 2023-05-08 RX ORDER — METHENAMINE, SODIUM PHOSPHATE, MONOBASIC, ANHYDROUS, PHENYL SALICYLATE, METHYLENE BLUE AND HYOSCYAMINE SULFATE 118; 40.8; 36; 10; .12 MG/1; MG/1; MG/1; MG/1; MG/1
1 CAPSULE ORAL 4 TIMES DAILY PRN
COMMUNITY
Start: 2023-05-02 | End: 2023-09-26

## 2023-05-08 RX ORDER — SULFAMETHOXAZOLE AND TRIMETHOPRIM 800; 160 MG/1; MG/1
1 TABLET ORAL 2 TIMES DAILY
COMMUNITY
Start: 2023-05-02 | End: 2023-09-26

## 2023-05-08 RX ORDER — DOXEPIN HYDROCHLORIDE 10 MG/1
10 CAPSULE ORAL NIGHTLY
Qty: 30 CAPSULE | Refills: 2 | Status: SHIPPED | OUTPATIENT
Start: 2023-05-08 | End: 2023-09-26

## 2023-05-08 NOTE — PROGRESS NOTES
Outpatient Psychiatry Follow-Up Visit    5/8/2023    Timeframe: Corona Virus Outbreak     The patient location is: Patient's home/ Patient reported that his/her location at the time of this visit was in the [facility]     Visit type: Virtual visit with synchronous audio and video     Each patient to whom he or she provides medical services by telehealth is: (1) informed of the relationship between the medical psychologist and patient and the respective role of any other health care provider with respect to management of the patient; and (2) notified that he or she may decline to receive medical services by telehealth and may withdraw from such care at any time.    I also informed patient of the following:   Sissy Wiley, PhD, MPAP:  [place] medical license number: [id number]    My contact info:  [facility] 2nd Floor  [address] [facility] [place] [address]   Ph: [phone]    If technology issues, call office phone: Ph: [phone]  If crisis: Dial 911 or go to nearest Emergency Room (ER)  If questions related to privacy practices: contact [facility] Information Department: [phone]    Chief Complaint:  Yulia Warren is a 37 y.o. female who presents today for follow-up of anxiety and sleep .       Preferred Name: Yulia  Gender Identity: cis female  Preferred Pronouns: she/her    Impressions/Plan from last visit: Yulia has a long history of anxiety with reported multiple failures in psychotropic medications.  As part of her anxiety, she reports difficulty with sleep.  We agreed to sent for gene testing to assist with medication options, and in the meantime, continue her Xanax as needed.  We also agreed to a trial of Seroquel to assist with anxiety and sleep at night.  She asked about the possibility of having ADHD, however since there is so much overlap between conditions with focus and concentration, we will stabilize her anxiety and then determine  "whether a further referral for testing is needed.        Medication Management: Discussed risks, benefits, and alternatives to treatment plan documented above with patient. I answered all patient questions related to this plan, and patient expressed understanding and agreement.   Start Seroquel 25-50 mg hs; Xanax 1 mg prn #20  Outside records/collateral information from additional sources: order release for records from PCP  Therapy referral put in system  Gene testing     Follow up in about 6 weeks (around 5/2/2023) for new medicine recheck.     Interval History and Content of Current Session: Yulia attended her virtual visit. She said that the Seroquel helps her sleep but gives her "really bad nightmares" about people trying to get her kids, breaking into her house, etc. She has not slept well--we agreed to switch and try doxepin for sleep. We talked again about sleep hygiene and utilizing a non-stimulating activity out of bed after 30 minutes, etc. See plan below. [Need to review gene results next visit]    Plan--continue Xanax 1 mg prn #20; trial of doxepin 10 mg hs     since March 2023.          Prior medicines: Prozac, Lexapro (no effect), Celexa (anxiety worse), Zoloft (no effect), Pristiq (no effect), Effexor (no effect), Cymbalta (no effect), Wellbutrin XL (angry), buspirone (felt physically sick), trazodone, Vistaril, Xanax,  temazepam, Valium, Seroquel (nightmares)    GAD7 5/8/2023 3/18/2023   1. Feeling nervous, anxious, or on edge? 3 3   2. Not being able to stop or control worrying? 3 3   3. Worrying too much about different things? 3 3   4. Trouble relaxing? 3 3   5. Being so restless that it is hard to sit still? 3 1   6. Becoming easily annoyed or irritable? 3 3   7. Feeling afraid as if something awful might happen? 0 1   MARIAH-7 Score 18 17      0-4 = Minimal anxiety  5-9 = Mild anxiety  10-14 = Moderate anxiety  15-21 = Severe anxiety       PHQ-9 not administered  0-4 = No intervention  5 to 9 = " Mild  10 to 14 = Moderate  15 to 19 = Moderately severe  =20 = Severe    Review of Systems   PSYCHIATRIC: Pertinant items are noted in the narrative.    Past Medical, Family and Social History: The patient's past medical, family and social history have been reviewed and updated as appropriate within the electronic medical record - see encounter notes.      Current Outpatient Medications:     ALPRAZolam (XANAX) 1 MG tablet, Take 1 tab by mouth daily for anxiety, not to exceed 20 tabs per 30 days, Disp: 20 tablet, Rfl: 2    ergocalciferol (ERGOCALCIFEROL) 50,000 unit Cap, Take 50,000 Units by mouth every 7 days., Disp: , Rfl:     NASCOBAL 500 mcg/spray nasal spray, every 7 days., Disp: , Rfl:     QUEtiapine (SEROQUEL) 25 MG Tab, Take 1-2 tablets (25-50 mg total) by mouth every evening., Disp: 60 tablet, Rfl: 2    Compliance: yes    Side effects: see above    Risk Parameters:  Patient reports no suicidal ideation  Patient reports no homicidal ideation  Patient reports no self-injurious behavior  Patient reports no violent behavior    Exam (detailed: at least 9 elements; comprehensive: all 15 elements)   Constitutional  Vitals:  Most recent vital signs were reviewed.   Last 3 sets of Vitals    Vitals - 1 value per visit 7/22/2022 7/22/2022 3/21/2023   SYSTOLIC - 112 127   DIASTOLIC - 80 84   Pulse - 70 66   Temp - 98.1 -   Resp - 20 -   SPO2 - 98 -   Weight (lb) - 121 128.31   Weight (kg) - 54.885 58.2   Height - 62 -   BMI (Calculated) - 22.1 -   VISIT REPORT - - -   Pain Score  4 - -          General:  age appropriate, casually dressed, neatly groomed     Musculoskeletal  Muscle Strength/Tone:  no tremor, no tic   Gait & Station:  video visit     Psychiatric  Speech:  no latency; no press   Behavior: wnl   Mood & Affect:  anxious, tired  congruent and appropriate   Thought Process:  normal and logical   Associations:  intact   Thought Content:  normal, no suicidality, no homicidality, delusions, or paranoia   Insight:   has awareness of illness   Judgement: behavior is adequate to circumstances   Orientation:  grossly intact   Memory: intact for content of interview   Language: grossly intact   Attention Span & Concentration:  Grossly intact   Fund of Knowledge:  intact and appropriate to age and level of education     Assessment and Diagnosis   Status/Progress: Based on the examination today, the patient's problem(s) is/are inadequately controlled and failing to respond as expected to treatment.  New problems have not been presented today.   Co-morbidities and psychosocial stressors  are complicating management of the primary condition.  There are no active rule-out diagnoses for this patient at this time.     General Impression:     Encounter Diagnoses   Name Primary?    Generalized anxiety disorder Yes    Insomnia, unspecified type          Intervention/Counseling/Treatment Plan   Medication Management: Discussed risks, benefits, and alternatives to treatment plan documented above with patient. I answered all patient questions related to this plan, and patient expressed understanding and agreement.   continue Xanax 1 mg prn #20; trial of doxepin 10 mg hs  Counseling as scheduled    Medication List with Changes/Refills   Current Medications    ALPRAZOLAM (XANAX) 1 MG TABLET    Take 1 tab by mouth daily for anxiety, not to exceed 20 tabs per 30 days    ERGOCALCIFEROL (ERGOCALCIFEROL) 50,000 UNIT CAP    Take 50,000 Units by mouth every 7 days.    NASCOBAL 500 MCG/SPRAY NASAL SPRAY    every 7 days.    QUETIAPINE (SEROQUEL) 25 MG TAB    Take 1-2 tablets (25-50 mg total) by mouth every evening.        Return to Clinic: 3 months    Time spent with pt including note preparation: 21 minutes       Sissy Wiley, PhD, MP  Advanced Practice Medical Psychologist  [facility]  [place] [id number]  [phone]   [phone] fax

## 2023-05-08 NOTE — LETTER
May 8, 2023        Arti Earl, ANGELP-C  [address]  [facility] [address]  [facility] [address]             [facility]  [address] [facility] [address]  Phone: [phone]  Fax: [phone]   Patient: Yulia Warren   MR Number: [id number]   YOB: 1985   Date of Visit: 5/8/2023     Dear Dr. Earl,     I saw Yulia today for follow-up. Please see attached, and let me know if you have any questions or need more information. I appreciate following her along with you.    Sincerely,    Sissy Wiley, PhD, MPAP  Advanced Practice Medical Psychologist            CC  Kathy Cuevas, [facility]    Enclosure

## 2023-05-08 NOTE — PATIENT INSTRUCTIONS
"[facility]PART[facility] OF [facility]YCHIATRY   PATIENT INFORMATION    We appreciate the opportunity to participate in your medical care and hope the following protocols will make it easier for you to receive quality treatment in our department.    PUNCTUALITY: Your appointment is scheduled for a fixed amount of time, reserved especially for you.  To get the benefit of your appointment, please arrive at least 15 minutes early to allow time for traffic, parking and registration.  Should you arrive more than 15 minutes late to your appointment, you will be rescheduled in order to assure your clinician has adequate time to assess you and provide helpful care.      APPOINTMENTS: Appointments are made by the nursing/front office staff or through the patient portal. Providers do not have access  to schedule appointments. Walk in appointments are not available. FOR EMERGENCIES, PLEASE GO THE CLOSEST EMERGENCY ROOM.    CANCELLATION/MISSED APPOINTMENTS:   In order to receive quality care, all appointments must be kept.  If you are unable to keep an appointment, please reschedule at least 3 days prior if possible. Late cancellations (within 24 hours of the appointment) and repeated no-show appointments may result in dismissal from the clinic. After two no show/late cancellation visits, you will receive a notice letter, alerting you to keep visits to prevent department dismissal. If another visit is missed after receipt of the notice, you will be discharged from the clinic. This policy is in effect to allow for other individuals on a long waiting list to be seen as soon as possible. Unlike other branches of medicine where several individuals can be scheduled in a 30 minute time slot, only one individual can be scheduled in any time slot in Psychiatry.     MESSAGES: For simple questions/concerns, you may contact your individual providers electronically through the "[facility]" portal or by calling [phone] " with messages relayed via office staff. If relevant, include pharmacy name and phone number, date of last visit and next scheduled visit, phone number where you can be reached throughout the day, and whether leaving a voicemail or message on an answering machine is acceptable. Messages will be returned by the Medical Assistant or Office Staff after your provider has reviewed the message.  Please allow 24 hours for a returned voicemail message before leaving another voicemail message. Voicemail messages will be checked each workday (Monday through Friday) during office hours (8:00 a.m. and 5:00 p.m.) and returned at most within one business day.  You may leave a non-urgent message after hours. Note that psychotherapy and medication management are not appropriate by telephone or the patient portal. Portal messages may take up to 72 hours for a direct response from your provider.    PRESCRIPTION REFILLS:  Please communicate with your prescriber about any refills you need during your appointment. You may also request refills through the [facility] portal (preferred) or by calling the clinic. Prescriptions will be filled during office hours.     Please do not wait until you are completely out of medication to request refills. Same day refills are not always possible. Patients may experience symptoms of withdrawal if they run out of medications. The patient assumes all responsibility when there is an issue with non-compliance with follow-up appointments and medications.  Some medications are controlled and regulated by the FDA and ROBERT. Some of these medications can not be refilled before 30 days and require a face to face appointment.     PAPERWORK REQUESTS: If you have any forms or letters that need to be completed by your doctor, please present these at the beginning of the appointment to ensure that information needed to complete them is obtained during the office visit. Paperwork is completed during office visits  "only.    SPECIAL EVALUATIONS: Please note that our department is treatment-focused. As such, we focus on treatment-oriented evaluations and do not perform specialty or "forensic" evaluations. Examples are listed below.    Disability: We do not do disability evaluations.  Please contact Social Security Administration for evaluations and determinations. You will then sign releases allowing for records from your treatment providers to be forwarded to Social Security Administration to use in their evaluation.  Gun Permit: We do not offer Sound Judgment Evaluations or assessments leading to gun ownership, nor do we fill out or file paperwork relevant to owning, concealing or purchasing a firearm.  Emotional Support     Animals (PHUONG): We do not provide documentation, including letters, to aid in the acclamation that an Emotional Support Animal is required. Note that ESAs are not trained to perform tasks or recognize particular signs or symptoms. Rather, they are distinguished by the close, emotional, and supportive bond between the animal and the owner.       SAMPLES: We do not provide samples of any medications. If you have financial difficulties and are on a limited income, you may qualify for Patient Assistance Programs from various pharmaceutical companies. This will require that you complete paperwork with your financial information, but this does not guarantee that the company will approve the application. Alternative medication options can be discussed. Some companies offer vouchers or coupons for discounts.    REFERRALS/COORDINATION: You will be referred to other providers if we feel unable to adequately diagnose or treat your particular condition, or if collaboration with another provider would allow for better management of your condition.    This document is for information purposes only. Please refer to the full disclaimer and copyright statement available at http://[email or web address].health.wa.gov.au regarding the " information from this website before making use of such information.  See website www.cci.health.wa.gov.au for more handouts and resources.    What is Sleep Hygiene?  Sleep hygiene is the term used to describe good sleep habits. Considerable research has gone into developing a set of guidelines and tips which are designed to enhance good sleeping, and there is much evidence to suggest that these strategies can provide long-term solutions to sleep difficulties. There are many medications which are used to treat insomnia,  but these tend to be only effective in the short-term. Ongoing use of sleeping pills may lead to dependence and interfere with developing good sleep habits independent of medication,  thereby prolonging sleep difficulties. Talk to your health professional about what is right for you, but we recommend good sleep hygiene as an important part of treating insomnia,  either with other strategies such as medication or cognitive therapy or alone.    Sleep Hygiene Tips  1) Get regular. One of the best ways to train your body to sleep well is to go to bed and get up at more or less the same time every day, even on weekends and days off! This regular rhythm will make you feel better and will give your body something to work from.  2) Sleep when sleepy. Only try to sleep when you actually feel tired or sleepy, rather than spending too much time awake in bed.  3) Get up & try again. If you havent been able to get to sleep after about 20 minutes or more, get up and do something calming or boring until you feel sleepy, then return to bed and try again. Sit quietly on the couch with the lights off (bright light will tell your brain that it is time to wake up), or read something boring like the phone book. Avoid doing anything that is too stimulating or interesting, as this will wake you up even more.  4) Avoid caffeine & nicotine. It is best to avoid consuming any caffeine (in coffee, tea, cola drinks,  chocolate, and some medications) or nicotine (cigarettes) for at least 4-6 hours before going to bed. These substances act as stimulants and interfere with the ability to fall asleep   5) Avoid alcohol. It is also best to avoid alcohol for at least 4-6 hours before going to bed. Many people believe that alcohol is relaxing and helps them to get to sleep at first, but it actually interrupts the quality of sleep.  6) Bed is for sleeping. Try not to use your bed for anything other than sleeping and sex, so that your body comes to associate bed with sleep. If you use bed as a place to watch TV, eat, read, work on your laptop, pay bills, and other things, your body will not learn this connection.  7) No naps. It is best to avoid taking naps during the day, to make sure that you are tired at bedtime. If you cant make it through the day without a nap, make sure it is for less than an hour and before 3pm.  8) Sleep rituals. You can develop your own rituals of things to remind your body that it is time to sleep - some people find it useful to do relaxing stretches or breathing exercises for 15 minutes before bed each night, or sit calmly with a cup of caffeine-free tea.  9) Bathtime. Having a hot bath 1-2 hours before bedtime can be useful, as it will raise your body temperature, causing you to feel sleepy as your body temperature drops again. Research shows that sleepiness is associated with a drop in body temperature.  10) No clock-watching. Many people who struggle with sleep tend to watch the clock too much. Frequently checking the clock during the night can wake you up (especially if you turn  on the light to read the time) and reinforces negative thoughts such as Oh no, look how late it is, Ill never get to sleep or its so early, I have only slept for 5 hours, this is  terrible.  11) Use a sleep diary. This worksheet can be a useful way of making sure you have the right facts about your sleep, rather than making  assumptions. Because a diary involves watching  the clock (see point 10) it is a good idea to only use it for two weeks to get an idea of what is going and then perhaps two months down the track to see how you are progressing.  12) Exercise. Regular exercise is a good idea to help with good sleep, but try not to do strenuous exercise in the 4 hours before bedtime. Morning walks are a great way to start the day feeling refreshed!  13) Eat right. A healthy, balanced diet will help you to sleep well, but timing is important. Some people find that a very empty stomach at bedtime is distracting, so it can be useful  to have a light snack, but a heavy meal soon before bed can also interrupt sleep. Some people recommend a warm glass of milk, which contains tryptophan, which acts as a natural  sleep inducer.  14) The right space. It is very important that your bed and bedroom are quiet and comfortable for sleeping. A cooler room with enough blankets to stay warm is best, and make sure you have curtains or an eyemask to block out early morning light and earplugs if there is noise outside your room.  15) Keep daytime routine the same. Even if you have a bad night sleep and are tired it is important that you try to keep your daytime activities the same as you had planned. That is, dont avoid activities because you feel tired. This can reinforce the insomnia.       Call In if problems  Call Report Side Effects   Encouraged to follow up with primary care / Gen Med MD for continued monitoring of general health and wellness  Call [phone] Or go to ER if Acute Concerns (especially if any thoughts of harm to self or other)          Sissy Wiley, PhD, MP  Advanced Practice Medical Psychologist  [facility]  [place] [id number]  [phone]   [phone] fax

## 2023-07-21 ENCOUNTER — TELEPHONE (OUTPATIENT)
Dept: PSYCHIATRY | Facility: CLINIC | Age: 38
End: 2023-07-21
Payer: MEDICAID

## 2023-07-28 ENCOUNTER — TELEPHONE (OUTPATIENT)
Dept: PSYCHIATRY | Facility: CLINIC | Age: 38
End: 2023-07-28
Payer: MEDICAID

## 2023-08-01 ENCOUNTER — PATIENT MESSAGE (OUTPATIENT)
Dept: PSYCHIATRY | Facility: CLINIC | Age: 38
End: 2023-08-01

## 2023-08-01 ENCOUNTER — TELEPHONE (OUTPATIENT)
Dept: PSYCHIATRY | Facility: CLINIC | Age: 38
End: 2023-08-01
Payer: MEDICAID

## 2023-08-01 NOTE — TELEPHONE ENCOUNTER
----- Message from Sissy Wiley, PhD, MPAP sent at 8/1/2023 11:15 AM CDT -----  Regarding: reschedule  Please see messages in chart--she had portal problems and did not log on for the visit today. She will need to be rescheduled. thanks

## 2023-09-25 ENCOUNTER — OFFICE VISIT (OUTPATIENT)
Dept: PSYCHIATRY | Facility: CLINIC | Age: 38
End: 2023-09-25
Payer: MEDICAID

## 2023-09-25 DIAGNOSIS — F32.1 CURRENT MODERATE EPISODE OF MAJOR DEPRESSIVE DISORDER, UNSPECIFIED WHETHER RECURRENT: ICD-10-CM

## 2023-09-25 DIAGNOSIS — G47.00 INSOMNIA, UNSPECIFIED TYPE: Primary | ICD-10-CM

## 2023-09-25 DIAGNOSIS — F41.1 GENERALIZED ANXIETY DISORDER: ICD-10-CM

## 2023-09-25 PROCEDURE — 90791 PR PSYCHIATRIC DIAGNOSTIC EVALUATION: ICD-10-PCS | Mod: 95,,, | Performed by: SOCIAL WORKER

## 2023-09-25 PROCEDURE — 90791 PSYCH DIAGNOSTIC EVALUATION: CPT | Mod: 95,,, | Performed by: SOCIAL WORKER

## 2023-09-26 ENCOUNTER — PATIENT MESSAGE (OUTPATIENT)
Dept: PSYCHIATRY | Facility: CLINIC | Age: 38
End: 2023-09-26

## 2023-09-26 ENCOUNTER — OFFICE VISIT (OUTPATIENT)
Dept: PSYCHIATRY | Facility: CLINIC | Age: 38
End: 2023-09-26
Payer: MEDICAID

## 2023-09-26 DIAGNOSIS — F39 MOOD DISORDER: Primary | ICD-10-CM

## 2023-09-26 DIAGNOSIS — F41.1 GENERALIZED ANXIETY DISORDER: ICD-10-CM

## 2023-09-26 DIAGNOSIS — Z63.0 PARTNER RELATIONAL PROBLEM: ICD-10-CM

## 2023-09-26 DIAGNOSIS — G47.00 INSOMNIA, UNSPECIFIED TYPE: ICD-10-CM

## 2023-09-26 PROCEDURE — 99215 PR OFFICE/OUTPT VISIT, EST, LEVL V, 40-54 MIN: ICD-10-PCS | Mod: 95,,, | Performed by: PSYCHOLOGIST

## 2023-09-26 PROCEDURE — 1159F PR MEDICATION LIST DOCUMENTED IN MEDICAL RECORD: ICD-10-PCS | Mod: CPTII,95,, | Performed by: PSYCHOLOGIST

## 2023-09-26 PROCEDURE — 99215 OFFICE O/P EST HI 40 MIN: CPT | Mod: 95,,, | Performed by: PSYCHOLOGIST

## 2023-09-26 PROCEDURE — 1159F MED LIST DOCD IN RCRD: CPT | Mod: CPTII,95,, | Performed by: PSYCHOLOGIST

## 2023-09-26 RX ORDER — LEUCOVORIN, FOLIC ACID, LEVOMEFOLATE MAGNESIUM, FERROUS CYSTEINE GLYCINATE, 1,2-DOCOSAHEXANOYL-SN-GLYCERO-3-PHOSPHOSERINE CALCIUM, 1,2-ICOSAPENTOYL-SN-GLYCERO-3-PHOSPHOSERINE CALCIUM, PHOSPHATIDYL SERINE, PYRIDOXAL 5-PHOSPHATE, FLAVIN ADENINE DINUCLEOTIDE, NADH, COBAMAMIDE, COCARBOXYLASE (THIAMINE PYROPHOSPHATE), MAGNESIUM ASCORBATE, ZINC ASCORBATE, MAGNESIUM L-THREONATE AND BETAINE 2.5; 1; 7; 13.6; 6.4; 800; 12; 25; 25; 25; 50; 25; 24; 1; 1; 500; 1.83; 3.67 MG/1; MG/1; MG/1; MG/1; MG/1; UG/1; MG/1; UG/1; UG/1; UG/1; UG/1; UG/1; MG/1; MG/1; MG/1; UG/1; MG/1; MG/1
1 CAPSULE, DELAYED RELEASE PELLETS ORAL DAILY
Qty: 30 EACH | Refills: 11
Start: 2023-09-26 | End: 2023-09-27 | Stop reason: SDUPTHER

## 2023-09-26 RX ORDER — ALPRAZOLAM 1 MG/1
1 TABLET ORAL DAILY
Qty: 30 TABLET | Refills: 2 | Status: SHIPPED | OUTPATIENT
Start: 2023-09-26 | End: 2023-11-16 | Stop reason: SDUPTHER

## 2023-09-26 RX ORDER — LAMOTRIGINE 25 MG/1
TABLET ORAL
Qty: 42 TABLET | Refills: 0 | Status: SHIPPED | OUTPATIENT
Start: 2023-09-26 | End: 2023-11-16 | Stop reason: DRUGHIGH

## 2023-09-26 RX ORDER — LAMOTRIGINE 100 MG/1
100 TABLET ORAL DAILY
Qty: 30 TABLET | Refills: 2 | Status: SHIPPED | OUTPATIENT
Start: 2023-10-24 | End: 2023-11-16 | Stop reason: SDUPTHER

## 2023-09-26 SDOH — SOCIAL DETERMINANTS OF HEALTH (SDOH): PROBLEMS IN RELATIONSHIP WITH SPOUSE OR PARTNER: Z63.0

## 2023-09-26 NOTE — PROGRESS NOTES
"Outpatient Psychiatry Follow-Up Visit    9/26/2023    Timeframe: Corona Virus Outbreak     The patient location is: Patient's home/ Patient reported that his/her location at the time of this visit was in the [facility]     Visit type: Virtual visit with synchronous audio and video     Each patient to whom he or she provides medical services by telehealth is: (1) informed of the relationship between the medical psychologist and patient and the respective role of any other health care provider with respect to management of the patient; and (2) notified that he or she may decline to receive medical services by telehealth and may withdraw from such care at any time.    I also informed patient of the following:   Sissy Wiley, PhD, MPAP:  [place] medical license number: [id number]    My contact info:  [facility] / 2nd Floor  [address] [place] [address]   Ph: [phone]    If technology issues, call office phone: Ph: [phone]  If crisis: Dial 911 or go to nearest Emergency Room (ER)  If questions related to privacy practices: contact [facility] Information Department: [phone]    Chief Complaint:  Yulia Warren is a 37 y.o. female who presents today for follow-up of anxiety and sleep .       Preferred Name: Yulia  Gender Identity: cis female  Preferred Pronouns: she/her    Impressions/Plan from last visit: Yulia attended her virtual visit. She said that the Seroquel helps her sleep but gives her "really bad nightmares" about people trying to get her kids, breaking into her house, etc. She has not slept well--we agreed to switch and try doxepin for sleep. We talked again about sleep hygiene and utilizing a non-stimulating activity out of bed after 30 minutes, etc. See plan below. [Need to review gene results next visit]    Plan--continue Xanax 1 mg prn #20; trial of doxepin 10 mg hs     Interval History and Content of Current Session: Yulia " "attended her virtual visit. We reviewed her gene results and discussed medicine options--she was tearful/emotional and initially very adamant on wanting just Xanax. She has been taking Xanax 2 mg a day--getting from her fiance. She said that she is having panic attacks daily--again advised against Xanax for panic as it can reinforce them rather than "treat" them. She is working--has kids in school. I mentioned possibly IOP, but that is not an option for her at this time given her work and parenting. She has very little support at this time but has started therapy. We agreed on a trial of Lamictal and Enlyte in addition to Xanax. She did not like doxepin for sleep. See plan below.    Plan--continue Xanax 1 mg prn (increased to #30 per 30 days); start trial of Lamictal 25 mg daily for 2 weeks, then 50 mg daily for 2 weeks, then 100 mg daily; Enlyte;       since May 2023.        ---------------------------------------------------------------------------------------------------------  Prior medicines: Prozac, Lexapro (no effect), Celexa (anxiety worse), Zoloft (no effect), Pristiq (no effect), Effexor (no effect), Cymbalta (no effect), Wellbutrin XL (angry), buspirone (felt physically sick), trazodone, Vistaril, Xanax,  temazepam, Valium, Seroquel (nightmares)    Gene report (2023): VMHY2R=V/C (moderately reduced response); TQR8Z=E/A (normal sensitivity); HLA-A 3101=A/T (higher risk for rash); HLA-B 1502=not present (normal risk); SLC6A4=L/S; COMT=sonu/sonu; MTHFR=C/T; LQK5S4=qutkclihtk metabolizer; CYP2D, 2B6, 9V5=dmefxqbcdvkn metabolizer        2023     9:38 AM 2023    10:50 AM 2023    10:23 AM   GAD7   1. Feeling nervous, anxious, or on edge? 3 3 3   2. Not being able to stop or control worrying? 3 2 3   3. Worrying too much about different things? 3 3 3   4. Trouble relaxing? 3 3 3   5. Being so restless that it is hard to sit still? 1 3 3   6. Becoming easily annoyed or irritable? 3 3 3   7. " Feeling afraid as if something awful might happen? 0 0 0   MARIAH-7 Score 16 17 18      0-4 = Minimal anxiety  5-9 = Mild anxiety  10-14 = Moderate anxiety  15-21 = Severe anxiety       PHQ-9 not administered  0-4 = No intervention  5 to 9 = Mild  10 to 14 = Moderate  15 to 19 = Moderately severe  =20 = Severe    Review of Systems   PSYCHIATRIC: Pertinant items are noted in the narrative.    Past Medical, Family and Social History: The patient's past medical, family and social history have been reviewed and updated as appropriate within the electronic medical record - see encounter notes.      Current Outpatient Medications:     ALPRAZolam (XANAX) 1 MG tablet, Take 1 tablet (1 mg total) by mouth once daily. Take 1 tab by mouth daily for anxiety, not to exceed 20 tabs per 30 days, Disp: 30 tablet, Rfl: 2    ergocalciferol (ERGOCALCIFEROL) 50,000 unit Cap, Take 50,000 Units by mouth every 7 days., Disp: , Rfl:     iron-FA-dha-epa-FAD-NADH-be-mv (ENLYTE) 1.5 mg iron- 8.73 mg CpID, Take 1 capsule by mouth once daily., Disp: 30 each, Rfl: 11    [START ON 10/24/2023] lamoTRIgine (LAMICTAL) 100 MG tablet, Take 1 tablet (100 mg total) by mouth once daily., Disp: 30 tablet, Rfl: 2    lamoTRIgine (LAMICTAL) 25 MG tablet, Take 1 tablet (25 mg total) by mouth once daily for 14 days, THEN 2 tablets (50 mg total) once daily for 14 days., Disp: 42 tablet, Rfl: 0    NASCOBAL 500 mcg/spray nasal spray, every 7 days., Disp: , Rfl:     Compliance: no    Side effects: see above    Risk Parameters:  Patient reports no suicidal ideation  Patient reports no homicidal ideation  Patient reports no self-injurious behavior  Patient reports no violent behavior    Exam (detailed: at least 9 elements; comprehensive: all 15 elements)   Constitutional  Vitals:  Most recent vital signs were reviewed.   Last 3 sets of Vitals        10/31/2019    12:19 PM 7/22/2022     3:13 PM 3/21/2023     9:46 AM   Vitals - 1 value per visit   SYSTOLIC 110 112 127  "  DIASTOLIC 80 80 84   Pulse 80 70 66   Temp 98.1 °F (36.7 °C) 98.1 °F (36.7 °C)    Resp 20 20    SPO2  98 %    Weight (lb) 116 121 128.31   Weight (kg) 52.617 54.885 58.2   Height 5' 2" (1.575 m) 5' 2" (1.575 m)    BMI (Calculated) 21.3 22.1    Pain Score Six Four           General:  age appropriate, casually dressed, neatly groomed     Musculoskeletal  Muscle Strength/Tone:  no tremor, no tic   Gait & Station:  video visit     Psychiatric  Speech:  no latency; no press   Behavior: wnl   Mood & Affect:  anxious, depressed  congruent and appropriate   Thought Process:  normal and logical   Associations:  intact   Thought Content:  normal, no suicidality, no homicidality, delusions, or paranoia   Insight:  has awareness of illness   Judgement: behavior is adequate to circumstances   Orientation:  grossly intact   Memory: intact for content of interview   Language: grossly intact   Attention Span & Concentration:  Grossly intact   Fund of Knowledge:  intact and appropriate to age and level of education     Assessment and Diagnosis   Status/Progress: Based on the examination today, the patient's problem(s) is/are inadequately controlled and failing to respond as expected to treatment.  New problems have not been presented today.   Co-morbidities, Lack of compliance, and psychosocial stressors  are complicating management of the primary condition.  The working differential for this patient includes likely mood component--has tried multiple antidepressants without improvement; possibly personality factor.     General Impression:     Encounter Diagnoses   Name Primary?    Mood disorder Yes    Generalized anxiety disorder     Insomnia, unspecified type     Partner relational problem        Intervention/Counseling/Treatment Plan   Medication Management: Discussed risks, benefits, and alternatives to treatment plan documented above with patient. I answered all patient questions related to this plan, and patient expressed " understanding and agreement.   continue Xanax 1 mg prn (increased to #30 per 30 days); start trial of Lamictal 25 mg daily for 2 weeks, then 50 mg daily for 2 weeks, then 100 mg daily; Enlyte  Counseling as scheduled    Medication List with Changes/Refills   New Medications    IRON-FA-DHA-EPA-FAD-NADH-BE-MV (ENLYTE) 1.5 MG IRON- 8.73 MG CPID    Take 1 capsule by mouth once daily.    LAMOTRIGINE (LAMICTAL) 100 MG TABLET    Take 1 tablet (100 mg total) by mouth once daily.    LAMOTRIGINE (LAMICTAL) 25 MG TABLET    Take 1 tablet (25 mg total) by mouth once daily for 14 days, THEN 2 tablets (50 mg total) once daily for 14 days.   Current Medications    ERGOCALCIFEROL (ERGOCALCIFEROL) 50,000 UNIT CAP    Take 50,000 Units by mouth every 7 days.    NASCOBAL 500 MCG/SPRAY NASAL SPRAY    every 7 days.   Changed and/or Refilled Medications    Modified Medication Previous Medication    ALPRAZOLAM (XANAX) 1 MG TABLET ALPRAZolam (XANAX) 1 MG tablet       Take 1 tablet (1 mg total) by mouth once daily. Take 1 tab by mouth daily for anxiety, not to exceed 20 tabs per 30 days    Take 1 tab by mouth daily for anxiety, not to exceed 20 tabs per 30 days   Discontinued Medications    DOXEPIN (SINEQUAN) 10 MG CAPSULE    Take 1 capsule (10 mg total) by mouth every evening.    SULFAMETHOXAZOLE-TRIMETHOPRIM 800-160MG (BACTRIM DS) 800-160 MG TAB    Take 1 tablet by mouth 2 (two) times daily.    URO--10-40.8-36 MG CAP    Take 1 capsule by mouth 4 (four) times daily as needed.        Return to Clinic: 6 weeks    Time spent with pt including note preparation: 29 minutes       Sissy Wiley, PhD, MP  Advanced Practice Medical Psychologist  [facility]  [place] [id number]  [phone]   [phone] fax

## 2023-09-26 NOTE — PATIENT INSTRUCTIONS
"[facility] DEPARTMENT OF PSYCHIATRY   PATIENT INFORMATION    We appreciate the opportunity to participate in your medical care and hope the following protocols will make it easier for you to receive quality treatment in our department.    PUNCTUALITY: Your appointment is scheduled for a fixed amount of time, reserved especially for you.  To get the benefit of your appointment, please arrive at least 15 minutes early to allow time for traffic, parking and registration.  Should you arrive more than 15 minutes late to your appointment, you will be rescheduled in order to assure your clinician has adequate time to assess you and provide helpful care.      APPOINTMENTS: Appointments are made by the nursing/front office staff or through the patient portal. Providers do not have access  to schedule appointments. Walk in appointments are not available. FOR EMERGENCIES, PLEASE GO THE CLOSEST EMERGENCY ROOM.    CANCELLATION/MISSED APPOINTMENTS:   In order to receive quality care, all appointments must be kept.  If you are unable to keep an appointment, please reschedule at least 3 days prior if possible. Late cancellations (within 24 hours of the appointment) and repeated no-show appointments may result in dismissal from the clinic. After two no show/late cancellation visits, you will receive a notice letter, alerting you to keep visits to prevent department dismissal. If another visit is missed after receipt of the notice, you will be discharged from the clinic. This policy is in effect to allow for other individuals on a long waiting list to be seen as soon as possible. Unlike other branches of medicine where several individuals can be scheduled in a 30 minute time slot, only one individual can be scheduled in any time slot in Psychiatry.     MESSAGES: For simple questions/concerns, you may contact your individual providers electronically through the "[facility]" portal or by calling [phone] " with messages relayed via office staff. If relevant, include pharmacy name and phone number, date of last visit and next scheduled visit, phone number where you can be reached throughout the day, and whether leaving a voicemail or message on an answering machine is acceptable. Messages will be returned by the Medical Assistant or Office Staff after your provider has reviewed the message.  Please allow 24 hours for a returned voicemail message before leaving another voicemail message. Voicemail messages will be checked each workday (Monday through Friday) during office hours (8:00 a.m. and 5:00 p.m.) and returned at most within one business day.  You may leave a non-urgent message after hours. Note that psychotherapy and medication management are not appropriate by telephone or the patient portal. Portal messages may take up to 72 hours for a direct response from your provider.    PRESCRIPTION REFILLS:  Please communicate with your prescriber about any refills you need during your appointment. You may also request refills through the [facility] portal (preferred) or by calling the clinic. Prescriptions will be filled during office hours.     Please do not wait until you are completely out of medication to request refills. Same day refills are not always possible. Patients may experience symptoms of withdrawal if they run out of medications. The patient assumes all responsibility when there is an issue with non-compliance with follow-up appointments and medications.  Some medications are controlled and regulated by the FDA and ROBERT. Some of these medications can not be refilled before 30 days and require a face to face appointment.     PAPERWORK REQUESTS: If you have any forms or letters that need to be completed by your doctor, please present these at the beginning of the appointment to ensure that information needed to complete them is obtained during the office visit. Paperwork is completed during office visits  "only.    SPECIAL EVALUATIONS: Please note that our department is treatment-focused. As such, we focus on treatment-oriented evaluations and do not perform specialty or "forensic" evaluations. Examples are listed below.    Disability: We do not do disability evaluations.  Please contact Social Security Administration for evaluations and determinations. You will then sign releases allowing for records from your treatment providers to be forwarded to Social Security Administration to use in their evaluation.  Gun Permit: We do not offer Sound Judgment Evaluations or assessments leading to gun ownership, nor do we fill out or file paperwork relevant to owning, concealing or purchasing a firearm.  Emotional Support     Animals (PHUONG): We do not provide documentation, including letters, to aid in the acclamation that an Emotional Support Animal is required. Note that ESAs are not trained to perform tasks or recognize particular signs or symptoms. Rather, they are distinguished by the close, emotional, and supportive bond between the animal and the owner.       SAMPLES: We do not provide samples of any medications. If you have financial difficulties and are on a limited income, you may qualify for Patient Assistance Programs from various pharmaceutical companies. This will require that you complete paperwork with your financial information, but this does not guarantee that the company will approve the application. Alternative medication options can be discussed. Some companies offer vouchers or coupons for discounts.    REFERRALS/COORDINATION: You will be referred to other providers if we feel unable to adequately diagnose or treat your particular condition, or if collaboration with another provider would allow for better management of your condition.    DR. CONTRERAS'S SCHEDULE/HOURS:    Monday 7:30 - 12:00; 1-4:30 Tuesday 7:30 - 12:00; 1-5:30 Wednesday 7:30 - 12:00; 1-5:30 Thursday 7:30 - 12:00; 1-5:30 Friday 7:30 - " 12:30     Call In if problems  Call Report Side Effects   Encouraged to follow up with primary care / Gen Med MD for continued monitoring of general health and wellness  Call 911 Or go to ER if Acute Concerns (especially if any thoughts of harm to self or other)    The risk of developing a rare but very serious rash while taking Lamictal was discussed. Patient was advised to take Lamictal exactly as prescribed, not to increase Lamictal unless directed and not to stop and start this medication. It was explained that patient needs to stay current with refills and can not miss taking Lamictal more than 4 days or it will have to be restarted at a reduced dose under prescriber supervision. Patient was advised not to start any new products while starting Lamictal. Should a mild rash develop, contact the nurse immediately for instructions. If a severe rash develops, discontinue Lamictal immediately and contact the nurse. Go to the ER for treatment if needed.            Sissy Wiley, PhD,   Advanced Practice Medical Psychologist  [facility]  [address]  [place] [address]  [phone]   [phone] fax

## 2023-09-26 NOTE — PROGRESS NOTES
Psychiatry Initial Visit (PhD/LCSW)  Diagnostic Interview - CPT 83802    Due to the nature of this visit type, a virtual visit with synchronous audio and video, each patient to whom this provider administers behavioral health services by telemedicine is: (1) informed of the relationship between the provider and patient and the respective role of any other health care provider with respect to management of the patient; and (2) notified that he or she may decline to receive services by telemedicine and may withdraw from such care at any time. If technological issues occur, at the professional discretion of the clinical provider, synchronous audio only services may be utilized after unsuccessful attempt(s) to connect via audiovisual services; similarly, if audio only visit occurs, patient's verbal consent will be obtained prior to receipt of service. Prevailing clinical standards of care are upheld despite service methodology; having said this, if the clinical provider is unable to meet the prevailing standards of care, the patient will be rescheduled for the provider's soonest availability - as clinically appropriate.     The patient was informed of the following:     Provider's contact info:  [facility] [address], [address], [address]  [place] [address]  (Phone) [phone]    If technology issues occur, call office phone: Ph: [phone]  If crisis: Dial 911 or go to nearest Emergency Room (ER)  If questions related to privacy practices: contact [facility] Information Department: [phone]    For security purposes, the pt identified that they were at [address] [place] [address] during today's session and contact number is [phone].    The pt's emergency contact(s) is No emergency contact information on file..    Crisis Disclaimer: Patient was informed that due to the virtual nature of the visit, that if a crisis develops, protocols  will be implemented to ensure patient safety, including but not limited to: 1) Initiating a welfare check with local law enforcement and/or 2) Calling 911     Date: 9/25/2023    Site: [facility]    Referral source: Dr Sissy Wiley, [facility]    Clinical status of patient: Outpatient    Yulia Warren, a 37 y.o. female, for initial evaluation visit.  Met with patient.    Chief complaint/reason for encounter: depression and anxiety        10/23/2023     7:37 AM 10/9/2023    12:46 PM 9/20/2023    11:30 AM   PHQ-9 Depression Patient Health Questionnaire   Patient agreed to terms: Yes Yes Yes   Little interest or pleasure in doing things 1 1 3   Feeling down, depressed, or hopeless 0 0 0   Trouble falling or staying asleep, or sleeping too much 3 1 3   Feeling tired or having little energy 3 3 3   Poor appetite or overeating 3 3 3   Feeling bad about yourself - or that you are a failure or have let yourself or your family down 0 1 0   Trouble concentrating on things, such as reading the newspaper or watching television 3 3 3   Moving or speaking so slowly that other people could have noticed. Or the opposite - being so fidgety or restless that you have been moving around a lot more than usual 0 0 0   Thoughts that you would be better off dead, or of hurting yourself in some way 0 0 0   PHQ-9 Total Score 13 12 15   If you checked off any problems, how difficult have these problems made it for you to do your work, take care of things at home, or get along with other people? Extremely dIfficult Extremely dIfficult Extremely dIfficult   Interpretation Moderate Moderate Moderately Severe           9/26/2023     9:38 AM 8/2/2023    10:50 AM 5/8/2023    10:23 AM 3/18/2023     5:56 PM   GAD7   1. Feeling nervous, anxious, or on edge? 3 3 3 3   2. Not being able to stop or control worrying? 3 2 3 3   3. Worrying too much about different things? 3 3 3 3   4. Trouble relaxing? 3 3 3 3   5. Being so restless that it is hard to sit still?  "1 3 3 1   6. Becoming easily annoyed or irritable? 3 3 3 3   7. Feeling afraid as if something awful might happen? 0 0 0 1   MARIAH-7 Score 16 17 18 17       History of present illness:  Met with this patient for her online initial visit appointment.  The patient was in her car throughout the appointment.  She is a patient of Dr. Sissy trujillo for medication. Dr. Wiley provided some initial biographical information: " Yulia reported having anxiety starting around age 11--her mom had taken her to the doctor. She reported that she grew up in [place] and her parents were alcoholics--"I didn't have the best childhood." The patient stated that she and her  have been together for 15 years although they do not get along well.  They have never .  She states that the father of her oldest child  of an accidental overdose and she also raised her 's youngest child, her stepson for the past 2 years and he was recently taken back by his mother.  She states that this has been very difficult for her and she is grieving the loss of this child that she had become very close to.  She states that she currently works at [employer] as a  and she wants to find another job.  She states that her partner Wong has a bad temper off and often starts fights where he works as a .  She is not sure she wants to stay with him much longer.  The patient discussed her anxiety and how difficult it was being with her partner Wong.  She feels that the relationship they have makes her anxiety much worse.  Discussed the possibility of her leaving him and how that would work logistically.  She actually feels that she would need a different job and to save money before she could take her children ages 14 and 5 and leave safely.  The patient stated that she would make a follow-up appointment.    Pain: noncontributory    Symptoms:   Mood: depressed mood, fatigue, and poor " "concentration  Anxiety: excessive anxiety/worry, restlessness/keyed up, and irritability  Substance abuse: denied  Cognitive functioning: denied  Health behaviors: noncontributory    Psychiatric history: currently under psychiatric care    Medical history:   Past Medical History:   Diagnosis Date    Generalized anxiety disorder        Family history of psychiatric illness:   Family History   Problem Relation Age of Onset    Arthritis Mother     Diabetes Mother     Rheum arthritis Maternal Grandmother         Social history (marriage, employment, etc.): Dr. Wiley also provided excellent social history: "Social history: Parents are still together and sober--"they are great now." She is the oldest of 3--has two younger brothers, ages 2 and 4 years younger. She recently found her biological dad--has a paternal half-brother (4 years younger); paternal half-sister (7 years younger). She said that she learned when she was 12 that her dad was not her biological dad--did 24 and Me--her half-brother found her through that. She met her bio dad twice--not in her life. The dad who raised her has been in her life she was 2.      Yulia had her first daughter at age 17 (daughter is 19)--he  when her daughter was 10 (they were not together). Yulia got with her significant other when she was 24--they have had two daughters (ages 13 and 4). She lives with her significant other and their two youngest daughters; oldest daughter is in college.      Yulia reported having "a handful of friends." She said that she has had "trust issues." She has two really good friends--best friends for 20 years.     She enjoys painting/drawing. She does not get to do it much--feels like there's no time during the day.     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; then laid off when COVID ended; she bartended in [place] but drive was too much; has job interview this week with [employer]  Social History     Social History Narrative    Not on file        Substance use:     Social History     Tobacco Use    Smoking status: Every Day     Current packs/day: 0.25     Types: Cigarettes    Smokeless tobacco: Never   Substance Use Topics    Alcohol use: Yes     Comment: occasionally        Current medications and drug reactions (include OTC, herbal):    Current Outpatient Medications:     ALPRAZolam (XANAX) 1 MG tablet, Take 1 tablet (1 mg total) by mouth once daily. Take 1 tab by mouth daily for anxiety, not to exceed 20 tabs per 30 days, Disp: 30 tablet, Rfl: 2    ergocalciferol (ERGOCALCIFEROL) 50,000 unit Cap, Take 50,000 Units by mouth every 7 days., Disp: , Rfl:     [START ON 10/24/2023] lamoTRIgine (LAMICTAL) 100 MG tablet, Take 1 tablet (100 mg total) by mouth once daily., Disp: 30 tablet, Rfl: 2    lamoTRIgine (LAMICTAL) 25 MG tablet, Take 1 tablet (25 mg total) by mouth once daily for 14 days, THEN 2 tablets (50 mg total) once daily for 14 days., Disp: 42 tablet, Rfl: 0    multivit41-iron-folate8-ps-dha (ENBRACE HR) 1.5 mg iron- 8.73 mg-6.4 mg capsule, Take 1 capsule by mouth once daily., Disp: 30 capsule, Rfl: 11    NASCOBAL 500 mcg/spray nasal spray, every 7 days., Disp: , Rfl:       Strengths and liabilities: Strength: Patient accepts guidance/feedback, Liability: Patient lacks coping skills.    Current Evaluation:     Mental Status Exam:  General Appearance:  unremarkable, age appropriate   Speech: normal tone, normal rate, normal pitch, normal volume      Level of Cooperation: cooperative      Thought Processes: normal and logical   Mood: anxious, depressed      Thought Content: normal, no suicidality, no homicidality, delusions, or paranoia    Affect: congruent and appropriate   Orientation: Oriented x3   Memory: recent >  intact   Attention Span & Concentration: intact   Fund of General Knowledge: intact and appropriate to age and level of education   Abstract Reasoning: interpretation of similarities was abstract   Judgment & Insight: fair     Language  intact     Diagnostic Impression - Plan:       ICD-10-CM ICD-9-CM   1. Insomnia, unspecified type  G47.00 780.52   2. Generalized anxiety disorder  F41.1 300.02   3. Current moderate episode of major depressive disorder, unspecified whether recurrent  F32.1 296.22       Plan:individual psychotherapy    Return to Clinic: as scheduled    Length of Service (minutes): 60       Kathy Cuevas LCSW  10/23/2023   9:55 AM

## 2023-09-27 DIAGNOSIS — F41.1 GENERALIZED ANXIETY DISORDER: Primary | ICD-10-CM

## 2023-09-27 DIAGNOSIS — F31.60 BIPOLAR I DISORDER, MOST RECENT EPISODE MIXED: ICD-10-CM

## 2023-09-27 RX ORDER — LEVOMEFOLATE MAGNESIUM, LEUCOVORIN, FOLIC ACID, FERROUS CYSTEINE GLYCINATE, MAGNESIUM ASCORBATE, ZINC ASCORBATE, COCARBOXYLASE, FLAVIN ADENINE DINUCLEOTIDE, NADH, PYRIDOXAL PHOSPHATE ANHYDROUS, COBAMAMIDE, BETAINE, MAGNESIUM L-THREONATE, 1,2-DOCOSAHEXANOYL-SN-GLYCERO-3-PHOSPHOSERINE CALCIUM, 1,2-ICOSAPENTOYL-SN-GLYCERO-3-PHOSPHOSERINE CALCIUM, AND PHOSPHATIDYL SERINE 5.23; 2.5; 1; 13.6; 24; 1; 25; 25; 25; 25; 50; 500; 1; 6.4; 800; 12 MG/1; MG/1; MG/1; MG/1; MG/1; MG/1; UG/1; UG/1; UG/1; UG/1; UG/1; UG/1; MG/1; MG/1; UG/1; MG/1
1 CAPSULE, DELAYED RELEASE PELLETS ORAL DAILY
Qty: 30 CAPSULE | Refills: 11 | Status: SHIPPED | OUTPATIENT
Start: 2023-09-27 | End: 2024-09-26

## 2023-10-09 ENCOUNTER — OFFICE VISIT (OUTPATIENT)
Dept: PSYCHIATRY | Facility: CLINIC | Age: 38
End: 2023-10-09
Payer: MEDICAID

## 2023-10-09 DIAGNOSIS — F41.1 GENERALIZED ANXIETY DISORDER: Primary | ICD-10-CM

## 2023-10-09 DIAGNOSIS — F31.60 BIPOLAR I DISORDER, MOST RECENT EPISODE MIXED: ICD-10-CM

## 2023-10-09 DIAGNOSIS — Z63.0 PARTNER RELATIONAL PROBLEM: ICD-10-CM

## 2023-10-09 PROCEDURE — 90834 PR PSYCHOTHERAPY W/PATIENT, 45 MIN: ICD-10-PCS | Mod: 95,,, | Performed by: SOCIAL WORKER

## 2023-10-09 PROCEDURE — 90834 PSYTX W PT 45 MINUTES: CPT | Mod: 95,,, | Performed by: SOCIAL WORKER

## 2023-10-09 SDOH — SOCIAL DETERMINANTS OF HEALTH (SDOH): PROBLEMS IN RELATIONSHIP WITH SPOUSE OR PARTNER: Z63.0

## 2023-10-20 ENCOUNTER — TELEPHONE (OUTPATIENT)
Dept: PSYCHIATRY | Facility: CLINIC | Age: 38
End: 2023-10-20
Payer: MEDICAID

## 2023-11-10 NOTE — PROGRESS NOTES
Individual Psychotherapy Follow-up Visit Progress Note (PhD/LCSW)     Due to the nature of this visit type, a virtual visit with synchronous audio and video, each patient to whom this provider administers behavioral health services by telemedicine is: (1) informed of the relationship between the provider and patient and the respective role of any other health care provider with respect to management of the patient; and (2) notified that he or she may decline to receive services by telemedicine and may withdraw from such care at any time. If technological issues occur, at the professional discretion of the clinical provider, synchronous audio only services may be utilized after unsuccessful attempt(s) to connect via audiovisual services; similarly, if audio only visit occurs, patient's verbal consent will be obtained prior to receipt of service. Prevailing clinical standards of care are upheld despite service methodology; having said this, if the clinical provider is unable to meet the prevailing standards of care, the patient will be rescheduled for the provider's soonest availability - as clinically appropriate.     The patient was informed of the following:     Provider's contact info:  [facility] [address], [address] Floor, [address]  [place] [address]  (Phone) [phone]    If technology issues occur, call office phone: Ph: [phone]  If crisis: Dial 911 or go to nearest Emergency Room (ER)  If questions related to privacy practices: contact [facility] Information Department: [phone]    For security purposes, the pt identified that they were at [address] [place] [address] during today's session and contact number is [phone].    The pt's emergency contact(s) is No emergency contact information on file..    Crisis Disclaimer: Patient was informed that due to the virtual nature of the visit, that if a crisis develops, protocols will  be implemented to ensure patient safety, including but not limited to: 1) Initiating a welfare check with local law enforcement and/or 2) Calling 911     Outpatient Psychotherapy - 45 minutes with patient (38-52 minutes) - [id number]    Date: 10/9/2023    Visit Type: Telehealth        10/9/2023  MRN: [id number]  Primary Care Provider: Arti Earl FNP-C    Yulia Warren is a 37 y.o. female who presents today for follow-up of depression and anxiety. Met with patient.      Preferred Name: Yulia   Transgender Identity Form    Gender Identity Form  Transition Summary         Subjective:     Last encounter (with this provider): 9/25/2023     Content of Current Session:  Met with this patient for her online follow-up appointment.  The patient was on the porch of her work place throughout the appointment.  She said it was her break.  She stated that she continues to have problems in her marriage with her .  She states that he continues to rage.   She said the amount of anger that he presents around the children is very distressing to all of them.  She is making plans to leave the  and set up another household taking the 3 children with her.  She is not afraid of her  but she is wanting to get away from his unhealthy behaviors and his alcoholism.  Discussed the importance of caring for herself as well as caring for the children and having good boundaries and protecting herself well.  She stated that she was very careful and that she would make a follow-up appointment.    Therapeutic Interventions Utilized During Current Session: Acceptance and Commitment Therapy, Cognitive Processing Therapy        9/26/2023     9:38 AM 8/2/2023    10:50 AM 5/8/2023    10:23 AM 3/18/2023     5:56 PM   GAD7   1. Feeling nervous, anxious, or on edge?       2. Not being able to stop or control worrying?       3. Worrying too much about different things?       4. Trouble relaxing?       5. Being so restless that it is hard  to sit still?       6. Becoming easily annoyed or irritable?       7. Feeling afraid as if something awful might happen?       MARIAH-7 Score           Information is confidential and restricted. Go to Review Flowsheets to unlock data.      0-4 = Minimal anxiety  5-9 = Mild anxiety  10-14 = Moderate anxiety  15-21 = Severe anxiety         10/23/2023     7:37 AM 10/9/2023    12:46 PM 9/20/2023    11:30 AM   PHQ-9 Depression Patient Health Questionnaire   Patient agreed to terms: Yes Yes Yes   Little interest or pleasure in doing things 1 1 3   Feeling down, depressed, or hopeless 0 0 0   Trouble falling or staying asleep, or sleeping too much 3 1 3   Feeling tired or having little energy 3 3 3   Poor appetite or overeating 3 3 3   Feeling bad about yourself - or that you are a failure or have let yourself or your family down 0 1 0   Trouble concentrating on things, such as reading the newspaper or watching television 3 3 3   Moving or speaking so slowly that other people could have noticed. Or the opposite - being so fidgety or restless that you have been moving around a lot more than usual 0 0 0   Thoughts that you would be better off dead, or of hurting yourself in some way 0 0 0   PHQ-9 Total Score 13 12 15   If you checked off any problems, how difficult have these problems made it for you to do your work, take care of things at home, or get along with other people? Extremely dIfficult Extremely dIfficult Extremely dIfficult   Interpretation Moderate Moderate Moderately Severe     0-4 = No intervention  5 to 9 = Mild  10 to 14 = Moderate  15 to 19 = Moderately severe  ?20 = Severe      Objective:       Mental Status Evaluation  Appearance: unremarkable, age appropriate  Behavior: normal, cooperative  Speech: normal tone, normal rate, normal pitch, normal volume  Mood: depressed  Affect: congruent and appropriate  Thought Process: normal and logical  Thought Content: normal, no suicidality, no homicidality,  delusions, or paranoia  Sensorium: grossly intact  Cognition: grossly intact  Insight: fair  Judgment: adequate to circumstances    Risk parameters:  Patient reports no suicidal ideation  Patient reports no homicidal ideation  Patient reports no self-injurious behavior  Patient reports no violent behavior      Assessment & Plan:     The patient's response to the interventions is accepting    The patient's progress toward treatment goals is fair     Homework assigned: practice relaxation skills daily and implement sleep hygiene routine     Treatment plan:   A. Target symptoms: Depression, Anxiety, and Adjustment Disorder   B. Therapeutic modalities: insight oriented psychotherapy  C. Why chosen therapy is appropriate versus another modality: patient responds to this modality   D. Outcome monitoring methods: self report, observation, rating scales, feedback from clinical staff      Visit Diagnosis:   1. Generalized anxiety disorder    2. Bipolar I disorder, most recent episode mixed    3. Partner relational problem        Follow-up: individual psychotherapy    Return to Clinic: as scheduled  Pt Reported to Schedule Self via Epic EMR MyChart Application and/or Department Support Staff      Kathy Cuevas LCSW  10/9/2023  2:48 PM

## 2023-11-14 ENCOUNTER — TELEPHONE (OUTPATIENT)
Dept: PSYCHIATRY | Facility: CLINIC | Age: 38
End: 2023-11-14
Payer: MEDICAID

## 2023-11-16 ENCOUNTER — OFFICE VISIT (OUTPATIENT)
Dept: PSYCHIATRY | Facility: CLINIC | Age: 38
End: 2023-11-16
Payer: MEDICAID

## 2023-11-16 DIAGNOSIS — G47.00 INSOMNIA, UNSPECIFIED TYPE: ICD-10-CM

## 2023-11-16 DIAGNOSIS — F41.1 GENERALIZED ANXIETY DISORDER: ICD-10-CM

## 2023-11-16 DIAGNOSIS — F39 MOOD DISORDER: Primary | ICD-10-CM

## 2023-11-16 DIAGNOSIS — Z63.0 PARTNER RELATIONAL PROBLEM: ICD-10-CM

## 2023-11-16 PROCEDURE — 99214 OFFICE O/P EST MOD 30 MIN: CPT | Mod: 95,,, | Performed by: PSYCHOLOGIST

## 2023-11-16 PROCEDURE — 99214 PR OFFICE/OUTPT VISIT, EST, LEVL IV, 30-39 MIN: ICD-10-PCS | Mod: 95,,, | Performed by: PSYCHOLOGIST

## 2023-11-16 PROCEDURE — 1159F MED LIST DOCD IN RCRD: CPT | Mod: CPTII,95,, | Performed by: PSYCHOLOGIST

## 2023-11-16 PROCEDURE — 1159F PR MEDICATION LIST DOCUMENTED IN MEDICAL RECORD: ICD-10-PCS | Mod: CPTII,95,, | Performed by: PSYCHOLOGIST

## 2023-11-16 RX ORDER — ALPRAZOLAM 1 MG/1
1 TABLET ORAL DAILY
Qty: 30 TABLET | Refills: 2 | Status: SHIPPED | OUTPATIENT
Start: 2023-11-16 | End: 2024-02-23 | Stop reason: SDUPTHER

## 2023-11-16 RX ORDER — LAMOTRIGINE 150 MG/1
150 TABLET ORAL DAILY
Qty: 30 TABLET | Refills: 2 | Status: SHIPPED | OUTPATIENT
Start: 2023-11-16 | End: 2024-02-21 | Stop reason: SDUPTHER

## 2023-11-16 SDOH — SOCIAL DETERMINANTS OF HEALTH (SDOH): PROBLEMS IN RELATIONSHIP WITH SPOUSE OR PARTNER: Z63.0

## 2023-11-16 NOTE — PATIENT INSTRUCTIONS

## 2023-11-16 NOTE — PROGRESS NOTES
"Outpatient Psychiatry Follow-Up Visit    11/16/2023    Timeframe: Corona Virus Outbreak     The patient location is: Patient's home/ Patient reported that his/her location at the time of this visit was in the [facility]     Visit type: Virtual visit with synchronous audio and video     Each patient to whom he or she provides medical services by telehealth is: (1) informed of the relationship between the medical psychologist and patient and the respective role of any other health care provider with respect to management of the patient; and (2) notified that he or she may decline to receive medical services by telehealth and may withdraw from such care at any time.    I also informed patient of the following:   Sissy Wiley, PhD, MPAP:  [place] medical license number: [id number]    My contact info:  [facility] / 2nd Floor  [address] [place] [address]   Ph: [phone]    If technology issues, call office phone: Ph: [phone]  If crisis: Dial 911 or go to nearest Emergency Room (ER)  If questions related to privacy practices: contact [facility] Information Department: [phone]    Chief Complaint:  Yulia Warren is a 37 y.o. female who presents today for follow-up of anxiety and sleep .       Preferred Name: Yulia  Gender Identity: cis female  Preferred Pronouns: she/her    Impressions/Plan from last visit: Yulia attended her virtual visit. We reviewed her gene results and discussed medicine options--she was tearful/emotional and initially very adamant on wanting just Xanax. She has been taking Xanax 2 mg a day--getting from her fiance. She said that she is having panic attacks daily--again advised against Xanax for panic as it can reinforce them rather than "treat" them. She is working--has kids in school. I mentioned possibly IOP, but that is not an option for her at this time given her work and parenting. She has very little support at this " time but has started therapy. We agreed on a trial of Lamictal and Enlyte in addition to Xanax. She did not like doxepin for sleep. See plan below.    Plan--continue Xanax 1 mg prn (increased to #30 per 30 days); start trial of Lamictal 25 mg daily for 2 weeks, then 50 mg daily for 2 weeks, then 100 mg daily; Enlyte;     Interval History and Content of Current Session: Yulia attended her virtual visit. We had previously started Lamictal--she said that she is still having panic attacks, though they are reportedly not as frequent.  She denied having any adverse effects.  We discussed increasing to 150 mg, and she will start that tomorrow.  We agreed to continue her Xanax at her current dose.  She has been taking a half tablet twice a day.  She is looking for another job to increase her income in hopes of moving out at some point in the near future with the kids.  She plans to continue where she is through the holidays and will revisit that situation in the new year.  She reported that she had a couple of visits with her therapist and found them very helpful.  She said she had to miss her last visit because of work conflict, and she asked to have that appointment rescheduled.    Plan--continue Xanax 1 mg prn (#30 per 30 days); increase Lamictal 150 mg daily; Enlyte;       since 2023.        ---------------------------------------------------------------------------------------------------------  Prior medicines: Prozac, Lexapro (no effect), Celexa (anxiety worse), Zoloft (no effect), Pristiq (no effect), Effexor (no effect), Cymbalta (no effect), Wellbutrin XL (angry), buspirone (felt physically sick), trazodone, Vistaril, Xanax,  temazepam, Valium, Seroquel (nightmares)    Gene report (2023): IGEP0P=L/C (moderately reduced response); LUD4H=C/A (normal sensitivity); HLA-A 3101=A/T (higher risk for rash); HLA-B 1502=not present (normal risk); SLC6A4=L/S; COMT=sonu/sonu; MTHFR=C/T; OBV7Q9=xccyjfvhkx  metabolizer; CYP2D, 2B6, 0U0=xlcihcyhmxih metabolizer        9/26/2023     9:38 AM 8/2/2023    10:50 AM 5/8/2023    10:23 AM   GAD7   1. Feeling nervous, anxious, or on edge? 3 3 3   2. Not being able to stop or control worrying? 3 2 3   3. Worrying too much about different things? 3 3 3   4. Trouble relaxing? 3 3 3   5. Being so restless that it is hard to sit still? 1 3 3   6. Becoming easily annoyed or irritable? 3 3 3   7. Feeling afraid as if something awful might happen? 0 0 0   MARIAH-7 Score 16 17 18      0-4 = Minimal anxiety  5-9 = Mild anxiety  10-14 = Moderate anxiety  15-21 = Severe anxiety       PHQ-9 not administered  0-4 = No intervention  5 to 9 = Mild  10 to 14 = Moderate  15 to 19 = Moderately severe  =20 = Severe    Review of Systems   PSYCHIATRIC: Pertinant items are noted in the narrative.    Past Medical, Family and Social History: The patient's past medical, family and social history have been reviewed and updated as appropriate within the electronic medical record - see encounter notes.      Current Outpatient Medications:     ALPRAZolam (XANAX) 1 MG tablet, Take 1 tablet (1 mg total) by mouth once daily. Take 1 tab by mouth daily for anxiety, not to exceed 20 tabs per 30 days, Disp: 30 tablet, Rfl: 2    ergocalciferol (ERGOCALCIFEROL) 50,000 unit Cap, Take 50,000 Units by mouth every 7 days., Disp: , Rfl:     lamoTRIgine (LAMICTAL) 100 MG tablet, Take 1 tablet (100 mg total) by mouth once daily., Disp: 30 tablet, Rfl: 2    multivit41-iron-folate8-ps-dha (ENBRACE HR) 1.5 mg iron- 8.73 mg-6.4 mg capsule, Take 1 capsule by mouth once daily., Disp: 30 capsule, Rfl: 11    NASCOBAL 500 mcg/spray nasal spray, every 7 days., Disp: , Rfl:     Compliance: yes    Side effects: see above    Risk Parameters:  Patient reports no suicidal ideation  Patient reports no homicidal ideation  Patient reports no self-injurious behavior  Patient reports no violent behavior    Exam (detailed: at least 9 elements;  "comprehensive: all 15 elements)   Constitutional  Vitals:  Most recent vital signs were reviewed.   Last 3 sets of Vitals        10/31/2019    12:19 PM 7/22/2022     3:13 PM 3/21/2023     9:46 AM   Vitals - 1 value per visit   SYSTOLIC 110 112 127   DIASTOLIC 80 80 84   Pulse 80 70 66   Temp 98.1 °F (36.7 °C) 98.1 °F (36.7 °C)    Resp 20 20    SPO2  98 %    Weight (lb) 116 121 128.31   Weight (kg) 52.617 54.885 58.2   Height 5' 2" (1.575 m) 5' 2" (1.575 m)    BMI (Calculated) 21.3 22.1    Pain Score Six Four           General:  age appropriate, casually dressed, neatly groomed     Musculoskeletal  Muscle Strength/Tone:  no tremor, no tic   Gait & Station:  video visit     Psychiatric  Speech:  no latency; no press   Behavior: wnl   Mood & Affect:  anxious, depressed, though a little better  congruent and appropriate   Thought Process:  normal and logical   Associations:  intact   Thought Content:  normal, no suicidality, no homicidality, delusions, or paranoia   Insight:  has awareness of illness   Judgement: behavior is adequate to circumstances   Orientation:  grossly intact   Memory: intact for content of interview   Language: grossly intact   Attention Span & Concentration:  Grossly intact   Fund of Knowledge:  intact and appropriate to age and level of education     Assessment and Diagnosis   Status/Progress: Based on the examination today, the patient's problem(s) is/are improved and adequately but not ideally controlled.  New problems have not been presented today.   Co-morbidities and psychosocial stressors  are complicating management of the primary condition.  The working differential for this patient includes likely mood component--has tried multiple antidepressants without improvement; possibly personality factor.     General Impression:     Encounter Diagnoses   Name Primary?    Mood disorder Yes    Generalized anxiety disorder     Insomnia, unspecified type     Partner relational problem  "       Intervention/Counseling/Treatment Plan   Medication Management: Discussed risks, benefits, and alternatives to treatment plan documented above with patient. I answered all patient questions related to this plan, and patient expressed understanding and agreement.   continue Xanax 1 mg prn (#30 per 30 days); increase Lamictal 150 mg daily  Counseling as scheduled    Medication List with Changes/Refills   Current Medications    ALPRAZOLAM (XANAX) 1 MG TABLET    Take 1 tablet (1 mg total) by mouth once daily. Take 1 tab by mouth daily for anxiety, not to exceed 20 tabs per 30 days    ERGOCALCIFEROL (ERGOCALCIFEROL) 50,000 UNIT CAP    Take 50,000 Units by mouth every 7 days.    LAMOTRIGINE (LAMICTAL) 100 MG TABLET    Take 1 tablet (100 mg total) by mouth once daily.    LAMOTRIGINE (LAMICTAL) 25 MG TABLET    Take 1 tablet (25 mg total) by mouth once daily for 14 days, THEN 2 tablets (50 mg total) once daily for 14 days.    MULTIVIT41-IRON-FOLATE8-PS-DHA (ENBRACE HR) 1.5 MG IRON- 8.73 MG-6.4 MG CAPSULE    Take 1 capsule by mouth once daily.    NASCOBAL 500 MCG/SPRAY NASAL SPRAY    every 7 days.        Return to Clinic: 3 months    Time spent with pt including note preparation: 14 minutes       Sissy Wiley, PhD, MP  Advanced Practice Medical Psychologist  [facility]  [facility] [address]  [place] [id number]  [phone]   [phone] fax

## 2024-02-21 DIAGNOSIS — F39 MOOD DISORDER: ICD-10-CM

## 2024-02-21 RX ORDER — LAMOTRIGINE 150 MG/1
150 TABLET ORAL DAILY
Qty: 30 TABLET | Refills: 0 | Status: SHIPPED | OUTPATIENT
Start: 2024-02-21 | End: 2024-02-23 | Stop reason: ALTCHOICE

## 2024-02-21 NOTE — TELEPHONE ENCOUNTER
Patient has an appt on 2/23/24  Medication was last filled on 1/14/24  Pharmacy faxed refill request

## 2024-02-23 ENCOUNTER — OFFICE VISIT (OUTPATIENT)
Dept: PSYCHIATRY | Facility: CLINIC | Age: 39
End: 2024-02-23
Payer: MEDICAID

## 2024-02-23 DIAGNOSIS — F39 MOOD DISORDER: Primary | ICD-10-CM

## 2024-02-23 DIAGNOSIS — F99 INSOMNIA DUE TO OTHER MENTAL DISORDER: ICD-10-CM

## 2024-02-23 DIAGNOSIS — F41.1 GENERALIZED ANXIETY DISORDER: ICD-10-CM

## 2024-02-23 DIAGNOSIS — F51.05 INSOMNIA DUE TO OTHER MENTAL DISORDER: ICD-10-CM

## 2024-02-23 PROCEDURE — 99214 OFFICE O/P EST MOD 30 MIN: CPT | Mod: 95,,, | Performed by: PSYCHOLOGIST

## 2024-02-23 PROCEDURE — 1159F MED LIST DOCD IN RCRD: CPT | Mod: CPTII,95,, | Performed by: PSYCHOLOGIST

## 2024-02-23 RX ORDER — VILAZODONE HYDROCHLORIDE 10 MG/1
10 TABLET ORAL EVERY MORNING
Qty: 30 TABLET | Refills: 0 | Status: SHIPPED | OUTPATIENT
Start: 2024-02-23 | End: 2024-02-27 | Stop reason: SDUPTHER

## 2024-02-23 RX ORDER — ALPRAZOLAM 1 MG/1
1 TABLET ORAL DAILY
Qty: 30 TABLET | Refills: 2 | Status: SHIPPED | OUTPATIENT
Start: 2024-02-23 | End: 2024-05-23

## 2024-02-23 RX ORDER — VILAZODONE HYDROCHLORIDE 20 MG/1
20 TABLET ORAL EVERY MORNING
Qty: 30 TABLET | Refills: 1 | Status: SHIPPED | OUTPATIENT
Start: 2024-03-24 | End: 2024-05-23

## 2024-02-23 NOTE — PATIENT INSTRUCTIONS

## 2024-02-23 NOTE — PROGRESS NOTES
Outpatient Psychiatry Follow-Up Visit    2/23/2024    Timeframe: Corona Virus Outbreak     The patient location is: Patient's home/ Patient reported that his/her location at the time of this visit was in the [facility]     Visit type: Virtual visit with synchronous audio and video     Each patient to whom he or she provides medical services by telehealth is: (1) informed of the relationship between the medical psychologist and patient and the respective role of any other health care provider with respect to management of the patient; and (2) notified that he or she may decline to receive medical services by telehealth and may withdraw from such care at any time.    I also informed patient of the following:   Sissy Wiley, PhD, MPAP:  [place] medical license number: [id number]    My contact info:  [facility] / 2nd Floor  [address] [place] [address]   Ph: [phone]    If technology issues, call office phone: Ph: [phone]  If crisis: Dial 911 or go to nearest Emergency Room (ER)  If questions related to privacy practices: contact [facility] Information Department: [phone]    Chief Complaint:  Yulia Warren is a 38 y.o. female who presents today for follow-up of anxiety and sleep .       Preferred Name: Yulia  Gender Identity: cis female  Preferred Pronouns: she/her    Impressions/Plan from last visit: Yulia attended her virtual visit. We had previously started Lamictal--she said that she is still having panic attacks, though they are reportedly not as frequent.  She denied having any adverse effects.  We discussed increasing to 150 mg, and she will start that tomorrow.  We agreed to continue her Xanax at her current dose.  She has been taking a half tablet twice a day.  She is looking for another job to increase her income in hopes of moving out at some point in the near future with the kids.  She plans to continue where she is through the  "holidays and will revisit that situation in the new year.  She reported that she had a couple of visits with her therapist and found them very helpful.  She said she had to miss her last visit because of work conflict, and she asked to have that appointment rescheduled.    Plan--continue Xanax 1 mg prn (#30 per 30 days); increase Lamictal 150 mg daily; Enlyte;    Interval History and Content of Current Session: Yulia attended her virtual visit. She is not feeling sick--she said that her whole family has been sick with a virus--congestion, coughing, fever, etc. She said that since we increased her Lamictal, her emotions "have been like a roller coaster." She believes that it made her emotions worse. She said that she may be fine and then irritable, then angry, then sad, then fine--"literally for no reason at all."    She reported having problems with focus/concentration--she said that it has been her whole life. Her younger brother was reportedly dx with ADHD. She said that she will start doing something and gets off track; does not finish tasks; struggles with maintaining attention/concentration. Grades were average. She is not currently working. 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 She said that her anxiety going to work was "through the roof" but once she was there, she was fine; then at closing, her anxiety was back.    We reviewed her gene results again--will try Viibryd to get her anxiety controlled (and stop Lamcital). We will further discuss whether prior authorization/testing for possible ADHD is needed at our next visit.     Plan--continue Xanax 1 mg prn (#30 per 30 days); Enlyte; decrease Lamictal 75 mg daily for 6-8 days, then stop; start Viibryd 10 mg for one month, then increase to 20 mg      since November 2023.        ---------------------------------------------------------------------------------------------------------  Prior medicines: Prozac, Lexapro (no effect), Celexa (anxiety worse), Zoloft (no " effect), Pristiq (no effect), Effexor (no effect), Cymbalta (no effect), Wellbutrin XL (angry), buspirone (felt physically sick), trazodone, Vistaril, Xanax,  temazepam, Valium, Seroquel (nightmares), Lamictal    Gene report (2023): CGYG6H=G/C (moderately reduced response); FVW4X=X/A (normal sensitivity); HLA-A 3101=A/T (higher risk for rash); HLA-B 1502=not present (normal risk); SLC6A4=L/S; COMT=sonu/sonu; MTHFR=C/T; EBC7J6=suigmvehlm metabolizer; CYP2D, 2B6, 1Y4=mgylawrzbtpl metabolizer        2024     9:40 AM 2023     4:31 PM 2023     9:38 AM   GAD7   1. Feeling nervous, anxious, or on edge? 3 3 3   2. Not being able to stop or control worrying? 3 1 3   3. Worrying too much about different things? 3 1 3   4. Trouble relaxing? 3 3 3   5. Being so restless that it is hard to sit still? 3 1 1   6. Becoming easily annoyed or irritable? 3 3 3   7. Feeling afraid as if something awful might happen? 1 0 0   MARIAH-7 Score 19 12 16      0-4 = Minimal anxiety  5-9 = Mild anxiety  10-14 = Moderate anxiety  15-21 = Severe anxiety       PHQ-9 not administered  0-4 = No intervention  5 to 9 = Mild  10 to 14 = Moderate  15 to 19 = Moderately severe  =20 = Severe    Review of Systems   PSYCHIATRIC: Pertinant items are noted in the narrative.    Past Medical, Family and Social History: The patient's past medical, family and social history have been reviewed and updated as appropriate within the electronic medical record - see encounter notes.      Current Outpatient Medications:     ALPRAZolam (XANAX) 1 MG tablet, Take 1 tablet (1 mg total) by mouth once daily. Take 1 tab by mouth daily for anxiety, not to exceed 20 tabs per 30 days, Disp: 30 tablet, Rfl: 2    ergocalciferol (ERGOCALCIFEROL) 50,000 unit Cap, Take 50,000 Units by mouth every 7 days., Disp: , Rfl:     lamoTRIgine (LAMICTAL) 150 MG Tab, Take 1 tablet (150 mg total) by mouth once daily., Disp: 30 tablet, Rfl: 0    multivit41-iron-folate8-ps-dha  "(ENBRACE HR) 1.5 mg iron- 8.73 mg-6.4 mg capsule, Take 1 capsule by mouth once daily., Disp: 30 capsule, Rfl: 11    NASCOBAL 500 mcg/spray nasal spray, every 7 days., Disp: , Rfl:     Compliance: yes    Side effects: see above    Risk Parameters:  Patient reports no suicidal ideation  Patient reports no homicidal ideation  Patient reports no self-injurious behavior  Patient reports no violent behavior    Exam (detailed: at least 9 elements; comprehensive: all 15 elements)   Constitutional  Vitals:  Most recent vital signs were reviewed.   Last 3 sets of Vitals        10/31/2019    12:19 PM 7/22/2022     3:13 PM 3/21/2023     9:46 AM   Vitals - 1 value per visit   SYSTOLIC 110 112 127   DIASTOLIC 80 80 84   Pulse 80 70 66   Temp 98.1 °F (36.7 °C) 98.1 °F (36.7 °C)    Resp 20 20    SPO2  98 %    Weight (lb) 116 121 128.31   Weight (kg) 52.617 54.885 58.2   Height 5' 2" (1.575 m) 5' 2" (1.575 m)    BMI (Calculated) 21.3 22.1    Pain Score Six Four           General:  age appropriate, casually dressed, neatly groomed     Musculoskeletal  Muscle Strength/Tone:  no tremor, no tic   Gait & Station:  video visit     Psychiatric  Speech:  no latency; no press   Behavior: wnl   Mood & Affect:  anxious, irritable  congruent and appropriate   Thought Process:  normal and logical   Associations:  intact   Thought Content:  normal, no suicidality, no homicidality, delusions, or paranoia   Insight:  has awareness of illness   Judgement: behavior is adequate to circumstances   Orientation:  grossly intact   Memory: intact for content of interview   Language: grossly intact   Attention Span & Concentration:  Grossly intact   Fund of Knowledge:  intact and appropriate to age and level of education     Assessment and Diagnosis   Status/Progress: Based on the examination today, the patient's problem(s) is/are adequately but not ideally controlled.  New problems have been presented today.   Co-morbidities and psychosocial stressors  are " complicating management of the primary condition.  The working differential for this patient includes likely mood component--has tried multiple antidepressants without improvement; possibly personality factor; possible ADHD.     General Impression:     Encounter Diagnoses   Name Primary?    Mood disorder Yes    Generalized anxiety disorder     Insomnia due to other mental disorder        Intervention/Counseling/Treatment Plan   Medication Management: Discussed risks, benefits, and alternatives to treatment plan documented above with patient. I answered all patient questions related to this plan, and patient expressed understanding and agreement.   continue Xanax 1 mg prn (#30 per 30 days); Enlyte; decrease Lamictal 75 mg daily for 6-8 days, then stop; start Viibryd 10 mg for one month, then increase to 20 mg  Counseling    Medication List with Changes/Refills   Current Medications    ALPRAZOLAM (XANAX) 1 MG TABLET    Take 1 tablet (1 mg total) by mouth once daily. Take 1 tab by mouth daily for anxiety, not to exceed 20 tabs per 30 days    ERGOCALCIFEROL (ERGOCALCIFEROL) 50,000 UNIT CAP    Take 50,000 Units by mouth every 7 days.    LAMOTRIGINE (LAMICTAL) 150 MG TAB    Take 1 tablet (150 mg total) by mouth once daily.    MULTIVIT41-IRON-FOLATE8-PS-DHA (ENBRACE HR) 1.5 MG IRON- 8.73 MG-6.4 MG CAPSULE    Take 1 capsule by mouth once daily.    NASCOBAL 500 MCG/SPRAY NASAL SPRAY    every 7 days.        Return to Clinic: 2 months    Time spent with pt including note preparation: 28 minutes       Sissy Wiley, PhD, MP  Advanced Practice Medical Psychologist  [facility]  [place] [address]  [phone]   [phone] fax

## 2024-02-27 DIAGNOSIS — F39 MOOD DISORDER: ICD-10-CM

## 2024-02-27 DIAGNOSIS — F41.1 GENERALIZED ANXIETY DISORDER: ICD-10-CM

## 2024-02-27 RX ORDER — VILAZODONE HYDROCHLORIDE 10 MG/1
10 TABLET ORAL EVERY MORNING
Qty: 30 TABLET | Refills: 0 | Status: SHIPPED | OUTPATIENT
Start: 2024-02-27 | End: 2024-03-28

## 2024-03-14 ENCOUNTER — TELEPHONE (OUTPATIENT)
Dept: PSYCHIATRY | Facility: CLINIC | Age: 39
End: 2024-03-14
Payer: MEDICAID

## 2024-03-18 ENCOUNTER — PATIENT MESSAGE (OUTPATIENT)
Dept: PSYCHIATRY | Facility: CLINIC | Age: 39
End: 2024-03-18

## 2024-03-18 ENCOUNTER — OFFICE VISIT (OUTPATIENT)
Dept: PSYCHIATRY | Facility: CLINIC | Age: 39
End: 2024-03-18
Payer: MEDICAID

## 2024-03-18 DIAGNOSIS — R69 DIAGNOSIS DEFERRED: Primary | ICD-10-CM

## 2024-03-18 PROCEDURE — 99499 UNLISTED E&M SERVICE: CPT | Mod: 95,,, | Performed by: SOCIAL WORKER

## 2024-04-08 NOTE — PROGRESS NOTES
This patient left the virtual appointment shortly after she signed in. She did not return and will need to reschedule this appointment for another date and time.

## 2024-04-11 ENCOUNTER — TELEPHONE (OUTPATIENT)
Dept: PSYCHIATRY | Facility: CLINIC | Age: 39
End: 2024-04-11
Payer: MEDICAID

## 2024-04-19 ENCOUNTER — TELEPHONE (OUTPATIENT)
Dept: PSYCHIATRY | Facility: CLINIC | Age: 39
End: 2024-04-19
Payer: MEDICAID

## 2024-04-23 ENCOUNTER — TELEPHONE (OUTPATIENT)
Dept: PSYCHIATRY | Facility: CLINIC | Age: 39
End: 2024-04-23
Payer: MEDICAID

## 2024-04-23 NOTE — TELEPHONE ENCOUNTER
----- Message from Arianna Gautam sent at 4/23/2024  9:21 AM CDT -----  Contact: Yulia  Patient is calling to reschedule her appointment due to son having surgery this morning. Please callback [phone]

## 2024-04-23 NOTE — TELEPHONE ENCOUNTER
----- Message from Blanca Chow sent at 4/22/2024  5:54 PM CDT -----  Type:  Appointment Cancellation Request     Name of Caller:Pt  When is the first available appointment?No access  Would the patient rather a call back or a response via [facility]? Call back  Best Call Back Number:[phone]   Additional Information: Pt can not make her appointment. Child has to go to doctor. Please call to reschedule.

## 2024-05-09 ENCOUNTER — TELEPHONE (OUTPATIENT)
Dept: PSYCHIATRY | Facility: CLINIC | Age: 39
End: 2024-05-09
Payer: MEDICAID

## 2024-05-17 ENCOUNTER — TELEPHONE (OUTPATIENT)
Dept: PSYCHIATRY | Facility: CLINIC | Age: 39
End: 2024-05-17
Payer: MEDICAID

## 2024-09-06 ENCOUNTER — PATIENT MESSAGE (OUTPATIENT)
Dept: PSYCHIATRY | Facility: CLINIC | Age: 39
End: 2024-09-06
Payer: MEDICAID

## 2024-09-06 DIAGNOSIS — F41.1 GENERALIZED ANXIETY DISORDER: ICD-10-CM

## 2024-09-06 RX ORDER — ALPRAZOLAM 1 MG/1
1 TABLET ORAL DAILY
Qty: 30 TABLET | Refills: 0 | Status: SHIPPED | OUTPATIENT
Start: 2024-09-06 | End: 2024-10-06

## 2024-10-09 ENCOUNTER — PATIENT MESSAGE (OUTPATIENT)
Dept: PSYCHIATRY | Facility: CLINIC | Age: 39
End: 2024-10-09
Payer: MEDICAID

## 2024-10-10 ENCOUNTER — OFFICE VISIT (OUTPATIENT)
Dept: PSYCHIATRY | Facility: CLINIC | Age: 39
End: 2024-10-10
Payer: MEDICAID

## 2024-10-10 VITALS
SYSTOLIC BLOOD PRESSURE: 121 MMHG | HEART RATE: 86 BPM | WEIGHT: 172.38 LBS | BODY MASS INDEX: 31.53 KG/M2 | DIASTOLIC BLOOD PRESSURE: 80 MMHG

## 2024-10-10 DIAGNOSIS — F39 MOOD DISORDER: ICD-10-CM

## 2024-10-10 DIAGNOSIS — E66.3 OVERWEIGHT: ICD-10-CM

## 2024-10-10 DIAGNOSIS — F41.1 GENERALIZED ANXIETY DISORDER: Primary | ICD-10-CM

## 2024-10-10 PROCEDURE — 99999 PR PBB SHADOW E&M-EST. PATIENT-LVL II: CPT | Mod: PBBFAC,,, | Performed by: PSYCHOLOGIST

## 2024-10-10 PROCEDURE — 99212 OFFICE O/P EST SF 10 MIN: CPT | Mod: PBBFAC | Performed by: PSYCHOLOGIST

## 2024-10-10 RX ORDER — NALTREXONE HYDROCHLORIDE 50 MG/1
50 TABLET, FILM COATED ORAL 2 TIMES DAILY
Qty: 60 TABLET | Refills: 2 | Status: SHIPPED | OUTPATIENT
Start: 2024-11-21 | End: 2025-02-19

## 2024-10-10 RX ORDER — BUPROPION HYDROCHLORIDE 150 MG/1
150 TABLET ORAL EVERY MORNING
Qty: 30 TABLET | Refills: 2 | Status: SHIPPED | OUTPATIENT
Start: 2024-10-10 | End: 2025-01-08

## 2024-10-10 RX ORDER — NALTREXONE HYDROCHLORIDE 50 MG/1
TABLET, FILM COATED ORAL
Qty: 49 TABLET | Refills: 0 | Status: SHIPPED | OUTPATIENT
Start: 2024-10-10

## 2024-10-10 RX ORDER — ALPRAZOLAM 1 MG/1
1 TABLET ORAL DAILY
Qty: 30 TABLET | Refills: 2 | Status: SHIPPED | OUTPATIENT
Start: 2024-10-10 | End: 2025-01-08

## 2024-10-10 RX ORDER — LEVOMEFOLATE MAGNESIUM, LEUCOVORIN, FOLIC ACID, FERROUS CYSTEINE GLYCINATE, MAGNESIUM ASCORBATE, ZINC ASCORBATE, COCARBOXYLASE, FLAVIN ADENINE DINUCLEOTIDE, NADH, PYRIDOXAL PHOSPHATE ANHYDROUS, COBAMAMIDE, BETAINE, MAGNESIUM L-THREONATE, 1,2-DOCOSAHEXANOYL-SN-GLYCERO-3-PHOSPHOSERINE CALCIUM, 1,2-ICOSAPENTOYL-SN-GLYCERO-3-PHOSPHOSERINE CALCIUM, AND PHOSPHATIDYL SERINE 5.23; 2.5; 1; 13.6; 24; 1; 25; 25; 25; 25; 50; 500; 1; 6.4; 800; 12 MG/1; MG/1; MG/1; MG/1; MG/1; MG/1; UG/1; UG/1; UG/1; UG/1; UG/1; UG/1; MG/1; MG/1; UG/1; MG/1
1 CAPSULE, DELAYED RELEASE PELLETS ORAL
COMMUNITY
Start: 2024-09-20

## 2024-10-10 NOTE — PROGRESS NOTES
"Outpatient Psychiatry Follow-Up Visit    10/10/2024      Chief Complaint:  Yulia Warren is a 38 y.o. female who presents today for follow-up of anxiety and sleep .       Preferred Name: Yulia  Gender Identity: cis female  Preferred Pronouns: she/her    LAST VISIT: Yulia attended her virtual visit. She is not feeling sick--she said that her whole family has been sick with a virus--congestion, coughing, fever, etc. She said that since we increased her Lamictal, her emotions "have been like a roller coaster." She believes that it made her emotions worse. She said that she may be fine and then irritable, then angry, then sad, then fine--"literally for no reason at all."    She reported having problems with focus/concentration--she said that it has been her whole life. Her younger brother was reportedly dx with ADHD. She said that she will start doing something and gets off track; does not finish tasks; struggles with maintaining attention/concentration. Grades were average. She is not currently working. 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 She said that her anxiety going to work was "through the roof" but once she was there, she was fine; then at closing, her anxiety was back.    We reviewed her gene results again--will try Viibryd to get her anxiety controlled (and stop Lamcital). We will further discuss whether prior authorization/testing for possible ADHD is needed at our next visit.     Plan--continue Xanax 1 mg prn (#30 per 30 days); Enlyte; decrease Lamictal 75 mg daily for 6-8 days, then stop; start Viibryd 10 mg for one month, then increase to 20 mg     CURRENT PRESENTATION: Yulia attended her visit. She was last seen by me in February of 2024. She said that she is "way less depressed." She has not been taking Viibryd--only took it about a month "and I didn't feel any different." She said that she quit working 4 months ago and has gained 40 pounds--she was a . She denied changes to her diet (reportedly " eating better). She is concerned about her weight. She starts school for phlebotomy next week--feels nervous about that. She has no energy--reported that she is sleeping better--still waking up 3 times a night but sleeping longer. She reported that she wakes up to go to the bathroom--she has talked to her provider about it and has upcoming tests. She is not in counseling--does not think that she needs it. See plan below.    Plan--continue Xanax 1 mg prn (#30 per 30 days); Enbrace (PCP); add Wellbutrin  mg and naltrexone 12.5 mg bid for 2 weeks, then 25 mg bid for 2 weeks, then 50 mg bid    Keren Earl, [facility]    Kids-- 21 in Nov y/o daughter, 17-y/o step-son, 15 and 5 y/o daughters     since 2024.          Prior medicines: Prozac, Lexapro (no effect), Celexa (anxiety worse), Zoloft (no effect), Pristiq (no effect), Effexor (no effect), Cymbalta (no effect), Wellbutrin XL (angry), buspirone (felt physically sick), trazodone, Vistaril, Xanax,  temazepam, Valium, Seroquel (nightmares), Lamictal, Viibryd    Gene report (2023): KRIQ9Q=O/C (moderately reduced response); YWK8X=I/A (normal sensitivity); HLA-A 3101=A/T (higher risk for rash); HLA-B 1502=not present (normal risk); SLC6A4=L/S; COMT=sonu/sonu; MTHFR=C/T; NRV1M3=hbtximwbrn metabolizer; CYP2D, 2B6, 3S2=vegobtayjkaq metabolizer  ___________________________________________________________________          10/9/2024    11:44 AM 10/2/2024     9:14 AM 2024     9:40 AM   GAD7   1. Feeling nervous, anxious, or on edge? 3  3  3    2. Not being able to stop or control worrying? 2  2  3    3. Worrying too much about different things? 2  2  3    4. Trouble relaxing? 3  3  3    5. Being so restless that it is hard to sit still? 1  1  3    6. Becoming easily annoyed or irritable? 3  3  3    7. Feeling afraid as if something awful might happen? 0  0  1    8. If you checked off any problems, how difficult have these problems made it for  you to do your work, take care of things at home, or get along with other people? 3  3     MARIAH-7 Score 14  14  19       Patient-reported      0-4 = Minimal anxiety  5-9 = Mild anxiety  10-14 = Moderate anxiety  15-21 = Severe anxiety       PHQ-9 not administered  0-4 = No intervention  5 to 9 = Mild  10 to 14 = Moderate  15 to 19 = Moderately severe  =20 = Severe    Review of Systems   PSYCHIATRIC: Pertinant items are noted in the narrative.    Past Medical, Family and Social History: The patient's past medical, family and social history have been reviewed and updated as appropriate within the electronic medical record - see encounter notes.      Current Outpatient Medications:     ENBRACE HR 1.5 mg iron- 8.73 mg-6.4 mg capsule, Take 1 capsule by mouth., Disp: , Rfl:     ALPRAZolam (XANAX) 1 MG tablet, Take 1 tablet (1 mg total) by mouth once daily., Disp: 30 tablet, Rfl: 2    buPROPion (WELLBUTRIN XL) 150 MG TB24 tablet, Take 1 tablet (150 mg total) by mouth every morning., Disp: 30 tablet, Rfl: 2    ergocalciferol (ERGOCALCIFEROL) 50,000 unit Cap, Take 50,000 Units by mouth every 7 days., Disp: , Rfl:     naltrexone (DEPADE) 50 mg tablet, Take 12.5 mg (1/4 tab) by mouth twice daily for 2 weeks, then take 25 mg (1/2 tab) twice daily for 2 weeks, then take 50 mg (1 tab) twice daily, Disp: 49 tablet, Rfl: 0    [START ON 11/21/2024] naltrexone (DEPADE) 50 mg tablet, Take 1 tablet (50 mg total) by mouth 2 (two) times a day. Take 12.5 mg (1/4 tab) by mouth twice daily for 2 weeks, then take 25 mg (1/2 tab) twice daily for 2 weeks, then take 50 mg (1 tab) twice daily, Disp: 60 tablet, Rfl: 2    NASCOBAL 500 mcg/spray nasal spray, every 7 days., Disp: , Rfl:     Compliance: no    Side effects: see above    Risk Parameters:  Patient reports no suicidal ideation  Patient reports no homicidal ideation  Patient reports no self-injurious behavior  Patient reports no violent behavior    Exam (detailed: at least 9 elements;  "comprehensive: all 15 elements)   Constitutional  Vitals:  Most recent vital signs were reviewed.   Last 3 sets of Vitals        7/22/2022     3:13 PM 3/21/2023     9:46 AM 10/10/2024    10:11 AM   Vitals - 1 value per visit   SYSTOLIC 112 127 121   DIASTOLIC 80 84 80   Pulse 70 66 86   Temp 98.1 °F (36.7 °C)     Resp 20     SPO2 98 %     Weight (lb) 121 128.31 172.4   Weight (kg) 54.885 58.2 78.2   Height 5' 2" (1.575 m)     BMI (Calculated) 22.1     Pain Score Four            General:  age appropriate, casually dressed, neatly groomed     Musculoskeletal  Muscle Strength/Tone:  no tremor, no tic   Gait & Station:  non-ataxic     Psychiatric  Speech:  no latency; no press   Behavior: wnl   Mood & Affect:  anxious  congruent and appropriate   Thought Process:  normal and logical   Associations:  intact   Thought Content:  normal, no suicidality, no homicidality, delusions, or paranoia   Insight:  has awareness of illness   Judgement: behavior is adequate to circumstances   Orientation:  grossly intact   Memory: intact for content of interview   Language: grossly intact   Attention Span & Concentration:  Grossly intact   Fund of Knowledge:  intact and appropriate to age and level of education     Assessment and Diagnosis   Status/Progress: Based on the examination today, the patient's problem(s) is/are inadequately controlled.  New problems have been presented today.   Co-morbidities, Lack of compliance, and psychosocial stressors  are complicating management of the primary condition.  The working differential for this patient includes likely mood component--has tried multiple antidepressants without improvement; possibly personality factor; possible ADHD.     General Impression:     Encounter Diagnoses   Name Primary?    Generalized anxiety disorder Yes    Overweight     Mood disorder        Intervention/Counseling/Treatment Plan   Medication Management: Discussed risks, benefits, and alternatives to treatment plan " documented above with patient. I answered all patient questions related to this plan, and patient expressed understanding and agreement.   continue Xanax 1 mg prn (#30 per 30 days); Enbrace (PCP); add Wellbutrin  mg and naltrexone 12.5 mg bid for 2 weeks, then 25 mg bid for 2 weeks, then 50 mg bid  Consider counseling      Medication List with Changes/Refills   New Medications    BUPROPION (WELLBUTRIN XL) 150 MG TB24 TABLET    Take 1 tablet (150 mg total) by mouth every morning.    NALTREXONE (DEPADE) 50 MG TABLET    Take 12.5 mg (1/4 tab) by mouth twice daily for 2 weeks, then take 25 mg (1/2 tab) twice daily for 2 weeks, then take 50 mg (1 tab) twice daily    NALTREXONE (DEPADE) 50 MG TABLET    Take 1 tablet (50 mg total) by mouth 2 (two) times a day. Take 12.5 mg (1/4 tab) by mouth twice daily for 2 weeks, then take 25 mg (1/2 tab) twice daily for 2 weeks, then take 50 mg (1 tab) twice daily   Current Medications    ENBRACE HR 1.5 MG IRON- 8.73 MG-6.4 MG CAPSULE    Take 1 capsule by mouth.    ERGOCALCIFEROL (ERGOCALCIFEROL) 50,000 UNIT CAP    Take 50,000 Units by mouth every 7 days.    NASCOBAL 500 MCG/SPRAY NASAL SPRAY    every 7 days.   Changed and/or Refilled Medications    Modified Medication Previous Medication    ALPRAZOLAM (XANAX) 1 MG TABLET ALPRAZolam (XANAX) 1 MG tablet       Take 1 tablet (1 mg total) by mouth once daily.    Take 1 tablet (1 mg total) by mouth once daily.   Discontinued Medications    VILAZODONE (VIIBRYD) 20 MG TAB    Take 1 tablet (20 mg total) by mouth every morning.        Return to Clinic: 6 weeks    Time spent with pt including note preparation: 26 minutes       Sissy Wiley, PhD, MP  Advanced Practice Medical Psychologist  [facility]  [place] [address]  [phone]   [phone] fax

## 2024-10-10 NOTE — PATIENT INSTRUCTIONS

## 2024-10-10 NOTE — LETTER
October 10, 2024        Arti Earl, FNP-C  [address]  [facility] [address]  [facility] [address]             [facility]  [address] [facility] [address]  Phone: [phone]  Fax: [phone]   Patient: Yulia Warren   MR Number: [id number]   YOB: 1985   Date of Visit: 10/10/2024     Dear Dr. Earl,     I saw Yulia today for follow-up.  Please see attached, and let me know if you have any questions or need more information.  I appreciate following her along with you.    Sincerely,    Sissy Wiley, PhD, MPAP  Advanced Practice Medical Psychologist          Jc

## 2024-10-11 ENCOUNTER — PATIENT MESSAGE (OUTPATIENT)
Dept: PSYCHIATRY | Facility: CLINIC | Age: 39
End: 2024-10-11
Payer: MEDICAID

## 2024-10-17 RX ORDER — LEVOMEFOLATE MAGNESIUM, LEUCOVORIN, FOLIC ACID, FERROUS CYSTEINE GLYCINATE, MAGNESIUM ASCORBATE, ZINC ASCORBATE, COCARBOXYLASE, FLAVIN ADENINE DINUCLEOTIDE, NADH, PYRIDOXAL PHOSPHATE ANHYDROUS, COBAMAMIDE, BETAINE, MAGNESIUM L-THREONATE, 1,2-DOCOSAHEXANOYL-SN-GLYCERO-3-PHOSPHOSERINE CALCIUM, 1,2-ICOSAPENTOYL-SN-GLYCERO-3-PHOSPHOSERINE CALCIUM, AND PHOSPHATIDYL SERINE 5.23; 2.5; 1; 13.6; 24; 1; 25; 25; 25; 25; 50; 500; 1; 6.4; 800; 12 MG/1; MG/1; MG/1; MG/1; MG/1; MG/1; UG/1; UG/1; UG/1; UG/1; UG/1; UG/1; MG/1; MG/1; UG/1; MG/1
1 CAPSULE, DELAYED RELEASE PELLETS ORAL
Qty: 30 CAPSULE | Refills: 11 | Status: SHIPPED | OUTPATIENT
Start: 2024-10-17

## 2024-11-27 ENCOUNTER — OFFICE VISIT (OUTPATIENT)
Dept: PSYCHIATRY | Facility: CLINIC | Age: 39
End: 2024-11-27
Payer: MEDICAID

## 2024-11-27 ENCOUNTER — PATIENT MESSAGE (OUTPATIENT)
Dept: PSYCHIATRY | Facility: CLINIC | Age: 39
End: 2024-11-27
Payer: MEDICAID

## 2024-11-27 VITALS
BODY MASS INDEX: 31.13 KG/M2 | HEART RATE: 85 BPM | WEIGHT: 170.19 LBS | SYSTOLIC BLOOD PRESSURE: 111 MMHG | DIASTOLIC BLOOD PRESSURE: 80 MMHG

## 2024-11-27 DIAGNOSIS — E66.3 OVERWEIGHT: ICD-10-CM

## 2024-11-27 DIAGNOSIS — F39 MOOD DISORDER: ICD-10-CM

## 2024-11-27 DIAGNOSIS — F41.1 GENERALIZED ANXIETY DISORDER: Primary | ICD-10-CM

## 2024-11-27 PROCEDURE — 99999 PR PBB SHADOW E&M-EST. PATIENT-LVL II: CPT | Mod: PBBFAC,,, | Performed by: PSYCHOLOGIST

## 2024-11-27 PROCEDURE — 99212 OFFICE O/P EST SF 10 MIN: CPT | Mod: PBBFAC | Performed by: PSYCHOLOGIST

## 2024-11-27 RX ORDER — BUPROPION HYDROCHLORIDE 150 MG/1
150 TABLET ORAL EVERY MORNING
Qty: 30 TABLET | Refills: 2 | Status: SHIPPED | OUTPATIENT
Start: 2024-11-27 | End: 2025-02-25

## 2024-11-27 RX ORDER — NALTREXONE HYDROCHLORIDE 50 MG/1
50 TABLET, FILM COATED ORAL 2 TIMES DAILY
Qty: 60 TABLET | Refills: 2 | OUTPATIENT
Start: 2024-11-27 | End: 2025-02-25

## 2024-11-27 RX ORDER — NALTREXONE HYDROCHLORIDE 50 MG/1
50 TABLET, FILM COATED ORAL 2 TIMES DAILY
Qty: 60 TABLET | Refills: 2 | Status: SHIPPED | OUTPATIENT
Start: 2024-11-27 | End: 2025-02-25

## 2024-11-27 RX ORDER — ALPRAZOLAM 1 MG/1
1 TABLET ORAL DAILY
Qty: 30 TABLET | Refills: 2 | Status: SHIPPED | OUTPATIENT
Start: 2024-11-27 | End: 2025-02-25

## 2024-11-27 NOTE — PROGRESS NOTES
"Outpatient Psychiatry Follow-Up Visit    11/27/2024      Chief Complaint:  Yulia Warren is a 38 y.o. female who presents today for follow-up of anxiety and sleep .       Preferred Name: Yulia  Gender Identity: cis female  Preferred Pronouns: she/her    LAST VISIT: Yulia attended her visit. She was last seen by me in February of 2024. She said that she is "way less depressed." She has not been taking Viibryd--only took it about a month "and I didn't feel any different." She said that she quit working 4 months ago and has gained 40 pounds--she was a . She denied changes to her diet (reportedly eating better). She is concerned about her weight. She starts school for phlebotomy next week--feels nervous about that. She has no energy--reported that she is sleeping better--still waking up 3 times a night but sleeping longer. She reported that she wakes up to go to the bathroom--she has talked to her provider about it and has upcoming tests. She is not in counseling--does not think that she needs it. See plan below.    Plan--continue Xanax 1 mg prn (#30 per 30 days); Enbrace (PCP); add Wellbutrin  mg and naltrexone 12.5 mg bid for 2 weeks, then 25 mg bid for 2 weeks, then 50 mg bid     CURRENT PRESENTATION: Yulia and daughter (Bree) (age 6) attended her visit. She said that she is not taking the naltrexone because she saw that it's an opiate blocker and she was not comfortable with that. She said that she had trouble getting the naltrexone from her pharmacy. She has been taking Wellbutrin and had a lot of irritability/agitation for the first 3 weeks of Wellbutrin, but that has improved. Her anxiety has continued and Xanax helps with that. Her smoking has significantly decreased--only about 3 a day now. She has wondered about ADHD--we discussed getting her other symptoms stable and then may consider whether additional testing is needed. They will be going to [place] with family " tomorrow.    Plan--continue Xanax 1 mg prn (#30 per 30 days); Enbrace (PCP); Wellbutrin  mg; naltrexone 12.5 mg bid for 2 weeks, then 25 mg bid for 2 weeks, then 50 mg bid    ANTHONY Matthews  [facility]ly [facility]    Kids-- 21 in Nov y/o daughter, 17-y/o step-son, 15 and 5 y/o daughters     since 2024.          Prior medicines: Prozac, Lexapro (no effect), Celexa (anxiety worse), Zoloft (no effect), Pristiq (no effect), Effexor (no effect), Cymbalta (no effect), Wellbutrin XL (angry), buspirone (felt physically sick), trazodone, Vistaril, Xanax,  temazepam, Valium, Seroquel (nightmares), Lamictal, Viibryd    Gene report (2023): HIFX0I=C/C (moderately reduced response); SGH6Y=T/A (normal sensitivity); HLA-A 3101=A/T (higher risk for rash); HLA-B 1502=not present (normal risk); SLC6A4=L/S; COMT=sonu/sonu; MTHFR=C/T; WID7M0=uykfdmwcaw metabolizer; CYP2D, 2B6, 1P6=zjvnuvtmiovk metabolizer  ___________________________________________________________________          2024     9:12 AM 10/9/2024    11:44 AM 10/2/2024     9:14 AM   GAD7   1. Feeling nervous, anxious, or on edge? 3  3  3    2. Not being able to stop or control worrying? 2  2  2    3. Worrying too much about different things? 3  2  2    4. Trouble relaxing? 3  3  3    5. Being so restless that it is hard to sit still? 1  1  1    6. Becoming easily annoyed or irritable? 3  3  3    7. Feeling afraid as if something awful might happen? 1  0  0    8. If you checked off any problems, how difficult have these problems made it for you to do your work, take care of things at home, or get along with other people? 3  3  3    MARIAH-7 Score 16  14  14        Patient-reported      0-4 = Minimal anxiety  5-9 = Mild anxiety  10-14 = Moderate anxiety  15-21 = Severe anxiety       PHQ-9 not administered  0-4 = No intervention  5 to 9 = Mild  10 to 14 = Moderate  15 to 19 = Moderately severe  =20 = Severe    Review of Systems   PSYCHIATRIC: Pertinant items  are noted in the narrative.    Past Medical, Family and Social History: The patient's past medical, family and social history have been reviewed and updated as appropriate within the electronic medical record - see encounter notes.      Current Outpatient Medications:     ALPRAZolam (XANAX) 1 MG tablet, Take 1 tablet (1 mg total) by mouth once daily., Disp: 30 tablet, Rfl: 2    buPROPion (WELLBUTRIN XL) 150 MG TB24 tablet, Take 1 tablet (150 mg total) by mouth every morning., Disp: 30 tablet, Rfl: 2    ergocalciferol (ERGOCALCIFEROL) 50,000 unit Cap, Take 50,000 Units by mouth every 7 days., Disp: , Rfl:     multivit41-iron-folate8-ps-dha (ENBRACE HR) 1.5 mg iron- 8.73 mg-6.4 mg capsule, TAKE ONE CAPSULE BY MOUTH DAILY, Disp: 30 capsule, Rfl: 11    naltrexone (DEPADE) 50 mg tablet, Take 1 tablet (50 mg total) by mouth 2 (two) times a day., Disp: 60 tablet, Rfl: 2    NASCOBAL 500 mcg/spray nasal spray, every 7 days., Disp: , Rfl:     Compliance: yes (partial)    Side effects: see above    Risk Parameters:  Patient reports no suicidal ideation  Patient reports no homicidal ideation  Patient reports no self-injurious behavior  Patient reports no violent behavior    Exam (detailed: at least 9 elements; comprehensive: all 15 elements)   Constitutional  Vitals:  Most recent vital signs were reviewed.   Last 3 sets of Vitals        3/21/2023     9:46 AM 10/10/2024    10:11 AM 11/27/2024    10:33 AM   Vitals - 1 value per visit   SYSTOLIC 127 121 111   DIASTOLIC 84 80 80   Pulse 66 86 85   Weight (lb) 128.31 172.4 170.2   Weight (kg) 58.2 78.2 77.2          General:  age appropriate, casually dressed, neatly groomed     Musculoskeletal  Muscle Strength/Tone:  no tremor, no tic   Gait & Station:  non-ataxic     Psychiatric  Speech:  no latency; no press   Behavior: wnl   Mood & Affect:  anxious  congruent and appropriate   Thought Process:  normal and logical   Associations:  intact   Thought Content:  normal, no  suicidality, no homicidality, delusions, or paranoia   Insight:  has awareness of illness   Judgement: behavior is adequate to circumstances   Orientation:  grossly intact   Memory: intact for content of interview   Language: grossly intact   Attention Span & Concentration:  Grossly intact   Fund of Knowledge:  intact and appropriate to age and level of education     Assessment and Diagnosis   Status/Progress: Based on the examination today, the patient's problem(s) is/are adequately but not ideally controlled.  New problems have not been presented today.   Co-morbidities and psychosocial stressors  are complicating management of the primary condition.  The working differential for this patient includes likely mood component--has tried multiple antidepressants without improvement; possibly personality factor; possible ADHD.     General Impression:     Encounter Diagnoses   Name Primary?    Generalized anxiety disorder Yes    Mood disorder     Overweight        Intervention/Counseling/Treatment Plan   Medication Management: Discussed risks, benefits, and alternatives to treatment plan documented above with patient. I answered all patient questions related to this plan, and patient expressed understanding and agreement.   continue Xanax 1 mg prn (#30 per 30 days); Enbrace (PCP); Wellbutrin  mg and naltrexone 12.5 mg bid for 2 weeks, then 25 mg bid for 2 weeks, then 50 mg bid  Consider counseling      Medication List with Changes/Refills   Current Medications    ERGOCALCIFEROL (ERGOCALCIFEROL) 50,000 UNIT CAP    Take 50,000 Units by mouth every 7 days.    MULTIVIT41-IRON-FOLATE8-PS-DHA (ENBRACE HR) 1.5 MG IRON- 8.73 MG-6.4 MG CAPSULE    TAKE ONE CAPSULE BY MOUTH DAILY    NASCOBAL 500 MCG/SPRAY NASAL SPRAY    every 7 days.   Changed and/or Refilled Medications    Modified Medication Previous Medication    ALPRAZOLAM (XANAX) 1 MG TABLET ALPRAZolam (XANAX) 1 MG tablet       Take 1 tablet (1 mg total) by mouth once  daily.    Take 1 tablet (1 mg total) by mouth once daily.    BUPROPION (WELLBUTRIN XL) 150 MG TB24 TABLET buPROPion (WELLBUTRIN XL) 150 MG TB24 tablet       Take 1 tablet (150 mg total) by mouth every morning.    Take 1 tablet (150 mg total) by mouth every morning.    NALTREXONE (DEPADE) 50 MG TABLET naltrexone (DEPADE) 50 mg tablet       Take 1 tablet (50 mg total) by mouth 2 (two) times a day.    Take 12.5 mg (1/4 tab) by mouth twice daily for 2 weeks, then take 25 mg (1/2 tab) twice daily for 2 weeks, then take 50 mg (1 tab) twice daily   Discontinued Medications    NALTREXONE (DEPADE) 50 MG TABLET    Take 1 tablet (50 mg total) by mouth 2 (two) times a day. Take 12.5 mg (1/4 tab) by mouth twice daily for 2 weeks, then take 25 mg (1/2 tab) twice daily for 2 weeks, then take 50 mg (1 tab) twice daily        Return to Clinic: 3 months      I spent a total of 25 minutes on the day of the visit. This includes face to face time and non-face to face time preparing to see the patient (eg, review of tests), obtaining and/or reviewing separately obtained history, documenting clinical information in the electronic or other health record, independently interpreting results and communicating results to the patient/family/caregiver, or care coordinator.        Sissy Wiley, PhD, MP  Advanced Practice Medical Psychologist  [facility]  [address]  [place] [address]  [phone]   [phone] fax

## 2024-11-27 NOTE — PATIENT INSTRUCTIONS

## 2025-03-04 ENCOUNTER — TELEPHONE (OUTPATIENT)
Dept: PSYCHIATRY | Facility: CLINIC | Age: 40
End: 2025-03-04
Payer: MEDICAID

## 2025-03-04 NOTE — TELEPHONE ENCOUNTER
Called Yulia to see if she was close for her appt--she said that she had called twice to reschedule because she could not get her car to start; will ask staff to contact her to reschedule for virtual for next visit

## 2025-03-04 NOTE — TELEPHONE ENCOUNTER
----- Message from Sissy Wiley sent at 3/4/2025 11:10 AM CST -----  Regarding: schedule  Please call pt to schedule with me in next couple of weeks if possible--she couldn't get her car to start today.Virtual is okay--schedule for a Monday or Fridaythanks

## 2025-03-07 ENCOUNTER — OFFICE VISIT (OUTPATIENT)
Dept: PSYCHIATRY | Facility: CLINIC | Age: 40
End: 2025-03-07
Payer: MEDICAID

## 2025-03-07 DIAGNOSIS — F41.9 ANXIETY AND DEPRESSION: ICD-10-CM

## 2025-03-07 DIAGNOSIS — F32.A ANXIETY AND DEPRESSION: ICD-10-CM

## 2025-03-07 DIAGNOSIS — F41.1 GENERALIZED ANXIETY DISORDER: Primary | ICD-10-CM

## 2025-03-07 RX ORDER — DESVENLAFAXINE 50 MG/1
50 TABLET, FILM COATED, EXTENDED RELEASE ORAL DAILY
Qty: 30 TABLET | Refills: 1 | Status: SHIPPED | OUTPATIENT
Start: 2025-03-28 | End: 2025-05-27

## 2025-03-07 RX ORDER — ALPRAZOLAM 1 MG/1
1 TABLET ORAL DAILY
Qty: 30 TABLET | Refills: 2 | Status: SHIPPED | OUTPATIENT
Start: 2025-03-07 | End: 2025-06-05

## 2025-03-07 RX ORDER — DESVENLAFAXINE SUCCINATE 25 MG/1
TABLET, EXTENDED RELEASE ORAL
Qty: 28 TABLET | Refills: 0 | Status: SHIPPED | OUTPATIENT
Start: 2025-03-07 | End: 2025-03-28

## 2025-03-07 NOTE — PATIENT INSTRUCTIONS

## 2025-03-07 NOTE — PROGRESS NOTES
Outpatient Psychiatry Follow-Up Visit    3/7/2025    Virtual Visit    The patient location is: Patient's home/ Patient reported that his/her location at the time of this visit was in the [facility]     Visit type: Virtual visit with synchronous audio and video     Each patient to whom he or she provides medical services by telehealth is: (1) informed of the relationship between the medical psychologist and patient and the respective role of any other health care provider with respect to management of the patient; and (2) notified that he or she may decline to receive medical services by telehealth and may withdraw from such care at any time.    I also informed patient of the following:   Sissy Wiley, PhD, MPAP:  [place] medical license number: [id number]    My contact info:  [facility] 2nd Floor  [address] [place] [address]   Ph: [phone]    If technology issues, call office phone: Ph: [phone] or [phone]  If crisis: Dial 911 or go to nearest Emergency Room (ER)  If questions related to privacy practices: contact [facility] Information Department: [phone]    Preferred Name: Yulia  Gender Identity: cis female  Preferred Pronouns: she/her      History of Present Illness    CHIEF COMPLAINT:  Yulia presents for follow-up regarding mood, anxiety, and weight management, last seen in October.    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 She mentions the recent loss of one of her best friends in a car accident involving a semi-truck last week. She has been seeing a cardiologist due to heart rate spikes and underwent a stress test yesterday, completed a 7-day heart monitor test, and is scheduled for another cardiac test on Monday at [facility]. An ultrasound is also planned. Yulia continues to experience chest pain and panic attacks with the same frequency and intensity as before, describing her  anxiety as ongoing and unchanged. She is currently taking semaglutide injections for weight management, reporting a loss of four lbs over three months of treatment. Her dosage has been gradually increased.    MEDICATIONS:  Yulia is on Wellbutrin orally for mood and anxiety, which was discontinued during the visit. She is also on Xanax orally as needed for anxiety. For weight loss, she is on weekly injections of Semaglutide 80-90 (?)nmg for 3 months. Yulia is also taking vitamins.    FAMILY HISTORY:  Family history is significant for mother with a history of low blood pressure.      ROS:  Constitutional: +weight loss  Cardiovascular: +chest pain  Psychiatric: +anxiety       PSYCHIATRIC: Pertinant items are noted in the narrative.    Past Medical, Family and Social History: The patient's past medical, family and social history have been reviewed and updated as appropriate within the electronic medical record - see encounter notes.     since October 2024.            3/5/2025    12:41 PM 2/26/2025    10:32 AM 11/20/2024     9:12 AM   GAD7   1. Feeling nervous, anxious, or on edge? 3 3 3   2. Not being able to stop or control worrying? 3 3 2   3. Worrying too much about different things? 3 3 3   4. Trouble relaxing? 3 3 3   5. Being so restless that it is hard to sit still? 2 1 1   6. Becoming easily annoyed or irritable? 3 3 3   7. Feeling afraid as if something awful might happen? 0 1 1   8. If you checked off any problems, how difficult have these problems made it for you to do your work, take care of things at home, or get along with other people? 3 3 3   MARIAH-7 Score 17  17  16        Patient-reported      0-4 = Minimal anxiety  5-9 = Mild anxiety  10-14 = Moderate anxiety  15-21 = Severe anxiety       Risk Parameters:  Patient reports no suicidal ideation  Patient reports no homicidal ideation  Patient reports no self-injurious behavior  Patient reports no violent behavior    Exam (detailed: at least 9 elements;  comprehensive: all 15 elements)   Constitutional  Vitals:  Most recent vital signs were reviewed.   Last 3 sets of Vitals        3/21/2023     9:46 AM 10/10/2024    10:11 AM 11/27/2024    10:33 AM   Vitals - 1 value per visit   SYSTOLIC 127 121 111   DIASTOLIC 84 80 80   Pulse 66 86 85   Weight (lb) 128.31 172.4 170.2   Weight (kg) 58.2 78.2 77.2          General:  age appropriate, casually dressed, neatly groomed     Musculoskeletal  Muscle Strength/Tone:  no tremor, no tic   Gait & Station:  video visit     Psychiatric  Speech:  no latency; no press   Behavior: wnl   Mood & Affect:  anxious  congruent and appropriate   Thought Process:  normal and logical   Associations:  intact   Thought Content:  normal, no suicidality, no homicidality, delusions, or paranoia   Insight:  has awareness of illness   Judgement: behavior is adequate to circumstances   Orientation:  grossly intact   Memory: intact for content of interview   Language: grossly intact   Attention Span & Concentration:  Grossly intact   Fund of Knowledge:  intact and appropriate to age and level of education     General Impression:     Encounter Diagnoses   Name Primary?    Generalized anxiety disorder Yes    Anxiety and depression        Assessment & Plan    - Evaluated cardiac concerns; anticipating negative cardiac review and possible anxiety-related etiology  - Reviewed gene testing results to guide antidepressant selection  - Will switch from Wellbutrin to Pristiq for mood and anxiety management  - Considered potential cardiac implications of Wellbutrin  - Noted progress on semaglutide for weight management    DEPRESSION AND ANXIETY:  - Explained Pristiq's dual action for depression and anxiety.  - Discussed titration process for Pristiq to minimize side effects.  - Started Pristiq 25 mg daily for 2 weeks, then increase to 50 mg daily.  - Discontinued Wellbutrin (no tapering required due to lowest dose).  - Continued Xanax as needed for anxiety  (current regimen maintained).    FOLLOW-UP:  - Follow up virtually in 6-8 weeks on a Friday mid-morning.  - Contact the office with cardiac test results when available.  - Follow up in-office in about 3 months to assess overall progress.         I spent a total of 23 minutes on the day of the visit. This includes face to face time and non-face to face time preparing to see the patient (eg, review of tests), obtaining and/or reviewing separately obtained history, documenting clinical information in the electronic or other health record, independently interpreting results and communicating results to the patient/family/caregiver, or care coordinator.        Sissy Wiley, PhD, MP  Advanced Practice Medical Psychologist  [facility] [address]  [place] [address]  [phone]   [phone] fax    This note was generated with the assistance of ambient listening technology. Verbal consent was obtained by the patient and accompanying visitor(s) for the recording of patient appointment to facilitate this note. I attest to having reviewed and edited the generated note for accuracy, though some syntax or spelling errors may persist. Please contact the author of this note for any clarification.

## 2025-04-25 ENCOUNTER — TELEPHONE (OUTPATIENT)
Dept: PSYCHIATRY | Facility: CLINIC | Age: 40
End: 2025-04-25
Payer: MEDICAID

## 2025-04-25 NOTE — TELEPHONE ENCOUNTER
Copied from CRM #[id number]. Topic: Appointments - Appointment Rescheduling  >> Apr 25, 2025  8:35 AM Alice wrote:  Type:  Needs Medical Advice    Who Called: pt  Symptoms (please be specific): na   How long has patient had these symptoms:  na  Pharmacy name and phone #:  na  Would the patient rather a call back or a response via [facility]? call  Best Call Back Number: [phone]    Additional Information: requesting to reschedule